# Patient Record
Sex: MALE | Race: WHITE | NOT HISPANIC OR LATINO | Employment: FULL TIME | URBAN - METROPOLITAN AREA
[De-identification: names, ages, dates, MRNs, and addresses within clinical notes are randomized per-mention and may not be internally consistent; named-entity substitution may affect disease eponyms.]

---

## 2021-01-14 ENCOUNTER — OFFICE VISIT (OUTPATIENT)
Dept: CARDIOLOGY | Facility: CLINIC | Age: 56
End: 2021-01-14

## 2021-01-14 VITALS
HEIGHT: 60 IN | OXYGEN SATURATION: 98 % | BODY MASS INDEX: 42.6 KG/M2 | HEART RATE: 70 BPM | WEIGHT: 217 LBS | SYSTOLIC BLOOD PRESSURE: 134 MMHG | DIASTOLIC BLOOD PRESSURE: 67 MMHG

## 2021-01-14 DIAGNOSIS — Z82.49 FAMILY HISTORY OF EARLY CAD: ICD-10-CM

## 2021-01-14 DIAGNOSIS — I20.0 UNSTABLE ANGINA PECTORIS (HCC): ICD-10-CM

## 2021-01-14 DIAGNOSIS — R42 POSTURAL DIZZINESS WITH PRESYNCOPE: Primary | ICD-10-CM

## 2021-01-14 DIAGNOSIS — R55 POSTURAL DIZZINESS WITH PRESYNCOPE: Primary | ICD-10-CM

## 2021-01-14 PROCEDURE — 99204 OFFICE O/P NEW MOD 45 MIN: CPT | Performed by: INTERNAL MEDICINE

## 2021-01-14 RX ORDER — SILDENAFIL 100 MG/1
100 TABLET, FILM COATED ORAL DAILY PRN
COMMUNITY
End: 2022-02-02 | Stop reason: ALTCHOICE

## 2021-01-14 RX ORDER — ONDANSETRON 4 MG/1
TABLET, ORALLY DISINTEGRATING ORAL AS NEEDED
COMMUNITY
Start: 2021-01-13

## 2021-01-14 RX ORDER — MECLIZINE HYDROCHLORIDE 25 MG/1
TABLET ORAL AS NEEDED
COMMUNITY
Start: 2021-01-13 | End: 2022-02-02

## 2021-01-14 RX ORDER — GABAPENTIN 100 MG/1
100 CAPSULE ORAL DAILY
COMMUNITY
Start: 2020-12-21

## 2021-01-14 NOTE — PROGRESS NOTES
"    Subjective:     Encounter Date:01/14/2021      Patient ID: Renzo Thompson is a 55 y.o. male.    Chief Complaint:  History of Present Illness  55-year-old white male patient with strong family history of CAD no previous cardiac issues comes to see me with episodes of chest discomfort with some arm pain on the right side dizziness presyncopal episode  Symptoms happened couple of days ago he was seen in the emergency room was told possible vertigo  EKG sinus rhythm borderline ST abnormalities  Advised patient echocardiogram stress test in the near future and Holter monitor  The following portions of the patient's history were reviewed and updated as appropriate: Allergies current medications past family history past medical history past social history past surgical history problem list and review of systems    /67 (BP Location: Left arm, Patient Position: Sitting, Cuff Size: Adult)   Pulse 70   Ht 71 cm (27.95\")   Wt 98.4 kg (217 lb)   SpO2 98%   .26 kg/m²     Past Medical History:   Diagnosis Date   • Hypertension      Past Surgical History:   Procedure Laterality Date   • 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   • HAND SURGERY     • HERNIA REPAIR      1985 & 1987   • TONSILLECTOMY       Social History     Socioeconomic History   • Marital status:      Spouse name: Not on file   • Number of children: Not on file   • Years of education: Not on file   • Highest education level: Not on file   Tobacco Use   • Smoking status: Former Smoker     Family History   Problem Relation Age of Onset   • Heart disease Father    • Hypertension Father        Current Outpatient Medications:   •  calcium citrate-vitamin d (CALCITRATE) 315-250 MG-UNIT tablet tablet, Take 1 tablet by mouth Daily., Disp: , Rfl:   •  esomeprazole (nexIUM) 20 MG capsule, Take 20 mg by mouth Every Morning Before Breakfast., Disp: , Rfl:   •  gabapentin (NEURONTIN) 100 MG capsule, Take 100 mg by mouth 2 (two) times a day., " Disp: , Rfl:   •  meclizine (ANTIVERT) 25 MG tablet, As Needed., Disp: , Rfl:   •  ondansetron ODT (ZOFRAN-ODT) 4 MG disintegrating tablet, As Needed., Disp: , Rfl:   •  sildenafil (VIAGRA) 100 MG tablet, Take 100 mg by mouth Daily As Needed for Erectile Dysfunction., Disp: , Rfl:   Allergies   Allergen Reactions   • Penicillins Unknown - Low Severity       Review of Systems   Constitution: Negative for fever and malaise/fatigue.   HENT: Negative for congestion and hearing loss.    Eyes: Negative for double vision and visual disturbance.   Cardiovascular: Positive for chest pain. Negative for claudication, dyspnea on exertion, leg swelling and syncope.   Respiratory: Negative for cough and shortness of breath.    Endocrine: Negative for cold intolerance.   Skin: Negative for color change and rash.   Musculoskeletal: Positive for arthritis. Negative for joint pain.   Gastrointestinal: Positive for heartburn. Negative for abdominal pain.   Genitourinary: Negative for hematuria.   Neurological: Positive for dizziness. Negative for excessive daytime sleepiness.   Psychiatric/Behavioral: Negative for depression. The patient is not nervous/anxious.    All other systems reviewed and are negative.             Objective:     Physical Exam  Patient alert not in any acute distress currently asymptomatic vital stable exam neck no JVP elevation lungs bilateral entry mostly clear heart sounds S1-S2 regular no significant murmur gallop or rub  Extremities no edema bilateral pulses present equal  No orthostatic hypotension  Procedures    Lab Review:       Assessment:          Diagnosis Plan   1. Postural dizziness with presyncope     2. Unstable angina pectoris (CMS/HCC)     3. Family history of early CAD            Plan:                  MDM  Number of Diagnoses or Management Options  Family history of early CAD: minor  Postural dizziness with presyncope: new, needed workup  Unstable angina pectoris (CMS/HCC): new, needed workup      Amount and/or Complexity of Data Reviewed  Clinical lab tests: ordered and reviewed  Review and summarize past medical records: yes  Independent visualization of images, tracings, or specimens: yes    Risk of Complications, Morbidity, and/or Mortality  Presenting problems: moderate  Management options: moderate    Patient Progress  Patient progress: other (comment)   We will schedule stress test echocardiogram Holter monitor close follow-up

## 2021-01-21 ENCOUNTER — OUTSIDE FACILITY SERVICE (OUTPATIENT)
Dept: CARDIOLOGY | Facility: CLINIC | Age: 56
End: 2021-01-21

## 2021-01-23 PROCEDURE — 93227 XTRNL ECG REC<48 HR R&I: CPT | Performed by: INTERNAL MEDICINE

## 2021-02-12 ENCOUNTER — TELEPHONE (OUTPATIENT)
Dept: CARDIOLOGY | Facility: CLINIC | Age: 56
End: 2021-02-12

## 2021-02-25 ENCOUNTER — OUTSIDE FACILITY SERVICE (OUTPATIENT)
Dept: CARDIOLOGY | Facility: CLINIC | Age: 56
End: 2021-02-25

## 2021-02-25 PROCEDURE — 93306 TTE W/DOPPLER COMPLETE: CPT | Performed by: INTERNAL MEDICINE

## 2021-02-25 PROCEDURE — 93016 CV STRESS TEST SUPVJ ONLY: CPT | Performed by: INTERNAL MEDICINE

## 2021-02-25 PROCEDURE — 78452 HT MUSCLE IMAGE SPECT MULT: CPT | Performed by: INTERNAL MEDICINE

## 2021-02-25 PROCEDURE — 93018 CV STRESS TEST I&R ONLY: CPT | Performed by: INTERNAL MEDICINE

## 2021-03-10 RX ORDER — AMLODIPINE BESYLATE 2.5 MG/1
2.5 TABLET ORAL DAILY
Qty: 90 TABLET | Refills: 3 | Status: SHIPPED | OUTPATIENT
Start: 2021-03-10 | End: 2021-03-25 | Stop reason: SDUPTHER

## 2021-03-10 NOTE — TELEPHONE ENCOUNTER
LOV 01/14/2021 - DENI PT    LABS no labs on file    NEXT OV unable to see future scheduled appts for Deni pt's.

## 2021-03-25 ENCOUNTER — OFFICE VISIT (OUTPATIENT)
Dept: CARDIOLOGY | Facility: CLINIC | Age: 56
End: 2021-03-25

## 2021-03-25 VITALS
OXYGEN SATURATION: 98 % | BODY MASS INDEX: 31.92 KG/M2 | HEART RATE: 98 BPM | HEIGHT: 71 IN | SYSTOLIC BLOOD PRESSURE: 136 MMHG | WEIGHT: 228 LBS | DIASTOLIC BLOOD PRESSURE: 81 MMHG

## 2021-03-25 DIAGNOSIS — E78.2 MIXED HYPERLIPIDEMIA: ICD-10-CM

## 2021-03-25 DIAGNOSIS — Z82.49 FAMILY HISTORY OF EARLY CAD: Primary | ICD-10-CM

## 2021-03-25 DIAGNOSIS — I27.20 PULMONARY HYPERTENSION (HCC): ICD-10-CM

## 2021-03-25 DIAGNOSIS — I10 ESSENTIAL HYPERTENSION: ICD-10-CM

## 2021-03-25 PROBLEM — I20.0 UNSTABLE ANGINA PECTORIS: Status: RESOLVED | Noted: 2021-01-14 | Resolved: 2021-03-25

## 2021-03-25 PROCEDURE — 99214 OFFICE O/P EST MOD 30 MIN: CPT | Performed by: INTERNAL MEDICINE

## 2021-03-25 PROCEDURE — 93010 ELECTROCARDIOGRAM REPORT: CPT | Performed by: INTERNAL MEDICINE

## 2021-03-25 RX ORDER — AMLODIPINE BESYLATE 2.5 MG/1
2.5 TABLET ORAL DAILY
Qty: 90 TABLET | Refills: 3 | Status: SHIPPED | OUTPATIENT
Start: 2021-03-25 | End: 2021-09-22 | Stop reason: SDUPTHER

## 2021-03-25 RX ORDER — METOPROLOL SUCCINATE 25 MG/1
25 TABLET, EXTENDED RELEASE ORAL DAILY
COMMUNITY
Start: 2021-03-20 | End: 2021-03-25 | Stop reason: SDUPTHER

## 2021-03-25 RX ORDER — ATORVASTATIN CALCIUM 20 MG/1
20 TABLET, FILM COATED ORAL DAILY
Qty: 90 TABLET | Refills: 3 | Status: SHIPPED | OUTPATIENT
Start: 2021-03-25 | End: 2021-09-22 | Stop reason: SDUPTHER

## 2021-03-25 RX ORDER — METOPROLOL SUCCINATE 25 MG/1
25 TABLET, EXTENDED RELEASE ORAL DAILY
Qty: 90 TABLET | Refills: 3 | Status: SHIPPED | OUTPATIENT
Start: 2021-03-25 | End: 2022-02-17 | Stop reason: SDUPTHER

## 2021-03-25 NOTE — PROGRESS NOTES
"    Subjective:     Encounter Date:03/25/2021      Patient ID: Renzo Thompson is a 56 y.o. male.    Chief Complaint: FU Testing - Dizzy - HTN  History of Present Illness   55-year-old white male patient with strong family history of CAD no previous cardiac issues comes to see me with episodes of chest discomfort with some arm pain on the right side dizziness presyncopal episode    January 2021 Holter monitor showed normal sinus rhythm rate supraventricular ectopy no ventricular ectopy noted rate salvos of supraventricular ectopy no significant pauses noted    February 2021 echocardiogram mild to moderate pulmonary hypertension RV size upper limits of normal LV function normalNo aortic stenosis  February 2021 stress Myoview showed large inferoapical fixed defect no ischemia EF 58%  Patient was started on low-dose Toprol-XL 25 mg every day amlodipine 2.5 mg every  Patient is feeling much better his recent labs showed  we will start Lipitor 20 mg every day  Patient will be followed again in 3 months with labs he is doing well on medical treatment advised pulmonary evaluation regarding sleep apnea      The following portions of the patient's history were reviewed and updated as appropriate: Allergies current medications past family history past medical history past social history past surgical history problem list and review of systems  Past Medical History:   Diagnosis Date   • Hypertension      Past Surgical History:   Procedure Laterality Date   • 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   • HAND SURGERY     • HERNIA REPAIR      1985 & 1987   • TONSILLECTOMY       /81 (BP Location: Left arm, Patient Position: Sitting, Cuff Size: Adult)   Pulse 98   Ht 180.3 cm (71\")   Wt 103 kg (228 lb)   SpO2 98%   BMI 31.80 kg/m²   Family History   Problem Relation Age of Onset   • Heart disease Father    • Hypertension Father        Current Outpatient Medications:   •  calcium citrate-vitamin d " (CALCITRATE) 315-250 MG-UNIT tablet tablet, Take 1 tablet by mouth Daily., Disp: , Rfl:   •  esomeprazole (nexIUM) 20 MG capsule, Take 20 mg by mouth Every Morning Before Breakfast., Disp: , Rfl:   •  gabapentin (NEURONTIN) 100 MG capsule, Take 100 mg by mouth 2 (two) times a day., Disp: , Rfl:   •  metoprolol succinate XL (TOPROL-XL) 25 MG 24 hr tablet, Take 25 mg by mouth Daily., Disp: , Rfl:   •  sildenafil (VIAGRA) 100 MG tablet, Take 100 mg by mouth Daily As Needed for Erectile Dysfunction., Disp: , Rfl:   •  amLODIPine (NORVASC) 2.5 MG tablet, Take 1 tablet by mouth Daily., Disp: 90 tablet, Rfl: 3  •  meclizine (ANTIVERT) 25 MG tablet, As Needed., Disp: , Rfl:   •  ondansetron ODT (ZOFRAN-ODT) 4 MG disintegrating tablet, As Needed., Disp: , Rfl:   Social History     Socioeconomic History   • Marital status:      Spouse name: Not on file   • Number of children: Not on file   • Years of education: Not on file   • Highest education level: Not on file   Tobacco Use   • Smoking status: Former Smoker     Allergies   Allergen Reactions   • Penicillins Unknown - Low Severity     Review of Systems   Constitutional: Negative for fever and malaise/fatigue.   HENT: Negative for congestion and hearing loss.    Eyes: Negative for double vision and visual disturbance.   Cardiovascular: Negative for chest pain, claudication, dyspnea on exertion, leg swelling and syncope.   Respiratory: Negative for cough and shortness of breath.    Endocrine: Negative for cold intolerance.   Skin: Negative for color change and rash.   Musculoskeletal: Negative for arthritis and joint pain.   Gastrointestinal: Negative for abdominal pain and heartburn.   Genitourinary: Positive for hematuria.   Neurological: Negative for excessive daytime sleepiness and dizziness.   Psychiatric/Behavioral: Negative for depression. The patient is not nervous/anxious.    All other systems reviewed and are negative.             Objective:     Physical  Exam  His vital stable neck no JVP elevation lungs clear heart sounds S1-S2 regular abdomen soft nontender extremities no edema bilateral pulses present equal  Procedures    Lab Review:       Assessment:          Diagnosis Plan   1. Family history of early CAD     2. Essential hypertension     3. Mixed hyperlipidemia            Plan:       MDM  Number of Diagnoses or Management Options  Essential hypertension: established, improving  Family history of early CAD: minor  Mixed hyperlipidemia: new, no workup  Pulmonary hypertension (CMS/HCC): new, needed workup     Amount and/or Complexity of Data Reviewed  Clinical lab tests: ordered and reviewed  Review and summarize past medical records: yes    Risk of Complications, Morbidity, and/or Mortality  Presenting problems: moderate  Management options: moderate    Patient Progress  Patient progress: stable

## 2021-07-16 DIAGNOSIS — E78.2 MIXED HYPERLIPIDEMIA: ICD-10-CM

## 2021-07-16 DIAGNOSIS — I27.20 PULMONARY HYPERTENSION (HCC): ICD-10-CM

## 2021-07-16 DIAGNOSIS — I20.0 UNSTABLE ANGINA PECTORIS (HCC): ICD-10-CM

## 2021-07-16 DIAGNOSIS — Z82.49 FAMILY HISTORY OF EARLY CAD: Primary | ICD-10-CM

## 2021-07-16 DIAGNOSIS — I10 ESSENTIAL HYPERTENSION: ICD-10-CM

## 2021-07-22 ENCOUNTER — OFFICE VISIT (OUTPATIENT)
Dept: CARDIOLOGY | Facility: CLINIC | Age: 56
End: 2021-07-22

## 2021-07-22 VITALS
WEIGHT: 233 LBS | SYSTOLIC BLOOD PRESSURE: 141 MMHG | OXYGEN SATURATION: 99 % | HEART RATE: 60 BPM | BODY MASS INDEX: 32.62 KG/M2 | HEIGHT: 71 IN | DIASTOLIC BLOOD PRESSURE: 95 MMHG

## 2021-07-22 DIAGNOSIS — E78.2 MIXED HYPERLIPIDEMIA: ICD-10-CM

## 2021-07-22 DIAGNOSIS — Z82.49 FAMILY HISTORY OF EARLY CAD: Primary | ICD-10-CM

## 2021-07-22 DIAGNOSIS — I10 ESSENTIAL HYPERTENSION: ICD-10-CM

## 2021-07-22 PROCEDURE — 99214 OFFICE O/P EST MOD 30 MIN: CPT | Performed by: INTERNAL MEDICINE

## 2021-07-22 NOTE — PROGRESS NOTES
Subjective:     Encounter Date:07/22/2021      Patient ID: Renzo Thompson is a 56 y.o. male.    Chief Complaint: Coronary Artery Disease  History of Present Illness  56-year-old white male patient with strong family history of CAD no previous cardiac issues comes to see me with episodes of chest discomfort with some arm pain on the right side dizziness presyncopal episode    January 2021 Holter monitor showed normal sinus rhythm rate supraventricular ectopy no ventricular ectopy noted rate salvos of supraventricular ectopy no significant pauses noted    February 2021 echocardiogram mild to moderate pulmonary hypertension RV size upper limits of normal LV function normalNo aortic stenosis  February 2021 stress Myoview showed large inferoapical fixed defect no ischemia EF 58%  Patient was started on low-dose Toprol-XL 25 mg every day amlodipine 2.5 mg every  Patient is feeling much better his recent labs showed  we will start Lipitor 20 mg every day  Patient will be followed again in 3 months with labs he is doing well on medical treatment advised pulmonary evaluation regarding sleep apnea      Patient comes back for follow-up he is doing much better now is tolerating statins recent labs reviewed LDL is down to 76 electrolytes within normal limit liver enzymes normal.    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   • HAND SURGERY     • HERNIA REPAIR      1985 & 1987   • TONSILLECTOMY    "    /95 (BP Location: Left arm, Patient Position: Sitting, Cuff Size: Adult)   Pulse 60   Ht 180.3 cm (71\")   Wt 106 kg (233 lb)   SpO2 99%   BMI 32.50 kg/m²   Family History   Problem Relation Age of Onset   • Heart disease Father    • Hypertension Father        Current Outpatient Medications:   •  amLODIPine (NORVASC) 2.5 MG tablet, Take 1 tablet by mouth Daily., Disp: 90 tablet, Rfl: 3  •  atorvastatin (LIPITOR) 20 MG tablet, Take 1 tablet by mouth Daily., Disp: 90 tablet, Rfl: 3  •  calcium citrate-vitamin d (CALCITRATE) 315-250 MG-UNIT tablet tablet, Take 1 tablet by mouth Daily., Disp: , Rfl:   •  esomeprazole (nexIUM) 20 MG capsule, Take 20 mg by mouth Every Morning Before Breakfast., Disp: , Rfl:   •  gabapentin (NEURONTIN) 100 MG capsule, Take 100 mg by mouth 2 (two) times a day., Disp: , Rfl:   •  meclizine (ANTIVERT) 25 MG tablet, As Needed., Disp: , Rfl:   •  metoprolol succinate XL (TOPROL-XL) 25 MG 24 hr tablet, Take 1 tablet by mouth Daily., Disp: 90 tablet, Rfl: 3  •  ondansetron ODT (ZOFRAN-ODT) 4 MG disintegrating tablet, As Needed., Disp: , Rfl:   •  sildenafil (VIAGRA) 100 MG tablet, Take 100 mg by mouth Daily As Needed for Erectile Dysfunction., Disp: , Rfl:   Social History     Socioeconomic History   • Marital status:      Spouse name: Not on file   • Number of children: Not on file   • Years of education: Not on file   • Highest education level: Not on file   Tobacco Use   • Smoking status: Former Smoker   • Smokeless tobacco: Never Used   Vaping Use   • Vaping Use: Never used   Substance and Sexual Activity   • Alcohol use: Not Currently   • Drug use: Never   • Sexual activity: Defer     Allergies   Allergen Reactions   • Penicillins Unknown - Low Severity     Review of Systems   Constitutional: Negative for fever and malaise/fatigue.   HENT: Negative for congestion and hearing loss.    Eyes: Negative for double vision and visual disturbance.   Cardiovascular: Negative for " chest pain, claudication, dyspnea on exertion, leg swelling and syncope.   Respiratory: Negative for cough and shortness of breath.    Endocrine: Negative for cold intolerance.   Skin: Negative for color change and rash.   Musculoskeletal: Negative for arthritis and joint pain.   Gastrointestinal: Negative for abdominal pain and heartburn.   Genitourinary: Negative for hematuria.   Neurological: Negative for excessive daytime sleepiness and dizziness.   Psychiatric/Behavioral: Negative for depression. The patient is not nervous/anxious.    All other systems reviewed and are negative.             Objective:     Constitutional:       Appearance: Well-developed.   Eyes:      General: No scleral icterus.     Conjunctiva/sclera: Conjunctivae normal.   HENT:      Head: Normocephalic and atraumatic.    Mouth/Throat:      Mouth: No oral lesions.      Pharynx: Uvula midline.   Neck:      Thyroid: No thyromegaly.      Vascular: No carotid bruit or JVD.      Trachea: Trachea normal.   Pulmonary:      Effort: Pulmonary effort is normal.      Breath sounds: Normal breath sounds.   Cardiovascular:      Normal rate. Regular rhythm.      No gallop.   Pulses:     Intact distal pulses.   Abdominal:      General: Bowel sounds are normal.      Palpations: Abdomen is soft.   Musculoskeletal: Normal range of motion.      Cervical back: Neck supple. Skin:     General: Skin is warm. There is no cyanosis.   Neurological:      Mental Status: Alert and oriented to person, place, and time.      Comments: No focal deficits   Psychiatric:         Behavior: Behavior is cooperative.         Procedures    Lab Review:       Assessment:          Diagnosis Plan   1. Family history of early CAD     2. Mixed hyperlipidemia     3. Essential hypertension            Plan:       MDM  Number of Diagnoses or Management Options  Essential hypertension: established, improving  Family history of early CAD: established, improving  Mixed hyperlipidemia:  established, improving     Amount and/or Complexity of Data Reviewed  Clinical lab tests: ordered and reviewed  Review and summarize past medical records: yes    Risk of Complications, Morbidity, and/or Mortality  Presenting problems: moderate  Management options: moderate    Patient Progress  Patient progress: stable

## 2021-09-22 ENCOUNTER — TELEPHONE (OUTPATIENT)
Dept: CARDIOLOGY | Facility: CLINIC | Age: 56
End: 2021-09-22

## 2021-09-22 RX ORDER — AMLODIPINE BESYLATE 2.5 MG/1
2.5 TABLET ORAL DAILY
Qty: 90 TABLET | Refills: 3 | Status: SHIPPED | OUTPATIENT
Start: 2021-09-22 | End: 2022-12-12 | Stop reason: SDUPTHER

## 2021-09-22 RX ORDER — ATORVASTATIN CALCIUM 20 MG/1
20 TABLET, FILM COATED ORAL DAILY
Qty: 90 TABLET | Refills: 3 | Status: SHIPPED | OUTPATIENT
Start: 2021-09-22 | End: 2022-02-02 | Stop reason: SDUPTHER

## 2021-09-22 NOTE — TELEPHONE ENCOUNTER
Rx Refill Note  Requested Prescriptions     Pending Prescriptions Disp Refills   • amLODIPine (NORVASC) 2.5 MG tablet 90 tablet 3     Sig: Take 1 tablet by mouth Daily.   • atorvastatin (LIPITOR) 20 MG tablet 90 tablet 3     Sig: Take 1 tablet by mouth Daily.      Last office visit with prescribing clinician: 7/22/2021      Next office visit with prescribing clinician: f/u in [place]     Lipid Panel (07/21/2021)  Comprehensive Metabolic Panel (07/21/2021)         Angie Hernández MA  09/22/21, 09:14 EDT

## 2021-09-22 NOTE — TELEPHONE ENCOUNTER
Incoming Refill Request      Medication requested (name and dose): Amlodipine 2.5 MG  Atorvastatin 20 MG    Pharmacy where request should be sent:    [facility]    Additional details provided by patient: He is completely out. Called pharmacy, told not due for refill until Dec.     Best call back number: [phone]    Does the patient have less than a 3 day supply:  [x] Yes  [] No    Yuval Leach Rep  09/22/21, 09:04 EDT

## 2022-02-02 ENCOUNTER — OFFICE VISIT (OUTPATIENT)
Dept: CARDIOLOGY | Facility: CLINIC | Age: 57
End: 2022-02-02

## 2022-02-02 VITALS
OXYGEN SATURATION: 96 % | DIASTOLIC BLOOD PRESSURE: 95 MMHG | BODY MASS INDEX: 32.06 KG/M2 | SYSTOLIC BLOOD PRESSURE: 147 MMHG | WEIGHT: 229 LBS | HEIGHT: 71 IN | HEART RATE: 98 BPM

## 2022-02-02 DIAGNOSIS — I10 ESSENTIAL HYPERTENSION: ICD-10-CM

## 2022-02-02 DIAGNOSIS — Z82.49 FAMILY HISTORY OF EARLY CAD: Primary | ICD-10-CM

## 2022-02-02 DIAGNOSIS — E78.2 MIXED HYPERLIPIDEMIA: ICD-10-CM

## 2022-02-02 PROCEDURE — 99213 OFFICE O/P EST LOW 20 MIN: CPT | Performed by: INTERNAL MEDICINE

## 2022-02-02 RX ORDER — ATORVASTATIN CALCIUM 20 MG/1
20 TABLET, FILM COATED ORAL DAILY
Qty: 90 TABLET | Refills: 3 | Status: SHIPPED | OUTPATIENT
Start: 2022-02-02 | End: 2022-05-03

## 2022-02-02 RX ORDER — IBUPROFEN 800 MG
TABLET ORAL DAILY
COMMUNITY

## 2022-02-02 RX ORDER — VARDENAFIL 11.85 MG/1
TABLET ORAL
COMMUNITY
End: 2022-05-25 | Stop reason: DRUGHIGH

## 2022-02-02 NOTE — PROGRESS NOTES
HP      Name: Renzo Thompson ADMIT: (Not on file)   : 1965  PCP: Rosy Love APRN    MRN: [id number] LOS: 0 days   AGE/SEX: 57 y.o. male  ROOM: Room/bed info not found     Chief Complaint   Patient presents with   • Coronary Artery Disease     6 mo f/u       Subjective         History of present illness  Renzo Thompson is a 57-year-old male patient who has hypertension, dyslipidemia, family history of coronary artery disease, is here today for cardiac follow-up.  Patient is doing well denies any chest pain or shortness of breath, no palpitations no lower extremity edema no syncopal episodes.  No recent hospitalizations.  Patient has been on Lipitor for dyslipidemia however it was not renewed Dr. Laurent left the practice.    Past Medical History:   Diagnosis Date   • Hypertension    • Mixed hyperlipidemia 3/25/2021   • Pulmonary hypertension (HCC) 3/25/2021     Past Surgical History:   Procedure Laterality Date   • AMPUTATION      tip of middle finger left hand   • HAND SURGERY     • HERNIA REPAIR       &    • TONSILLECTOMY       Family History   Problem Relation Age of Onset   • Heart disease Father    • Hypertension Father      Social History     Tobacco Use   • Smoking status: Former Smoker   • Smokeless tobacco: Never Used   Vaping Use   • Vaping Use: Never used   Substance Use Topics   • Alcohol use: Not Currently   • Drug use: Never     (Not in a hospital admission)    Allergies:  Penicillins    Review of systems    Constitutional: Negative.    Respiratory and cardiovascular: As detailed in HPI section.  Gastrointestinal: Negative for constipation, nausea and vomiting negative for abdominal distention, abdominal pain and diarrhea.   Genitourinary: Negative for difficulty urinating and flank pain.   Musculoskeletal: Negative for arthralgias, joint swelling and myalgias.   Skin: Negative for color change, rash and wound.   Neurological: Negative for dizziness, syncope, weakness and  headaches.   Hematological: Negative for adenopathy.   Psychiatric/Behavioral: Negative for confusion.   All other systems reviewed and are negative.    Physical Exam  VITALS REVIEWED    General:      well developed, in no acute distress.    Head:      normocephalic and atraumatic.    Eyes:      PERRL/EOM intact, conjunctiva and sclera clear with out nystagmus.    Neck:      no masses, thyromegaly,  trachea central with normal respiratory effort and PMI displaced laterally  Lungs:      Clear to auscultation bilaterally  Heart:       Regular rate and rhythm  Msk:      no deformity or scoliosis noted of thoracic or lumbar spine.    Pulses:      pulses normal in all 4 extremities.    Extremities:       No lower extremity edema  Neurologic:      no focal deficits.   alert oriented x3  Skin:      intact without lesions or rashes.    Psych:      alert and cooperative; normal mood and affect; normal attention span and concentration.      Result Review :                Pertinent cardiac workup    1. EKG March 28, 2021 sinus rhythm normal EKG  2. Stress test 2/25/2021 inferior apical fixed defect, ejection fraction 58%  3. Echocardiogram 3/1/2021 ejection fraction 60%      Procedures        Assessment and Plan      Renzo Thompson is a 57-year-old male patient who has hypertension, dyslipidemia, family history of coronary artery disease in his father, age is therefore cardiac follow-up.  Patient does not have any anginal symptoms or CHF symptoms.  His lipid profile in July 2021 had shown LDL of 76, at that time he was on Lipitor.  I will go ahead and renew his Lipitor 20 mg a day.  His blood pressure in general is well controlled about 120/85 range.  At this time he does not need any specific work-up and I will see him in follow-up in 1 year or sooner if he has any cardiac issues.    Diagnoses and all orders for this visit:    1. Family history of early CAD (Primary)    2. Mixed hyperlipidemia    3. Essential  hypertension    Other orders  -     atorvastatin (LIPITOR) 20 MG tablet; Take 1 tablet by mouth Daily for 90 days.  Dispense: 90 tablet; Refill: 3           No follow-ups on file.  Patient was given instructions and counseling regarding his condition or for health maintenance advice. Please see specific information pulled into the AVS if appropriate.

## 2022-02-17 RX ORDER — METOPROLOL SUCCINATE 25 MG/1
25 TABLET, EXTENDED RELEASE ORAL DAILY
Qty: 90 TABLET | Refills: 3 | Status: SHIPPED | OUTPATIENT
Start: 2022-02-17 | End: 2023-03-06

## 2022-02-17 NOTE — TELEPHONE ENCOUNTER
Rx Refill Note  Requested Prescriptions     Pending Prescriptions Disp Refills   • metoprolol succinate XL (TOPROL-XL) 25 MG 24 hr tablet 90 tablet 3     Sig: Take 1 tablet by mouth Daily.      Last office visit with prescribing clinician: 2/2/2022      Next office visit with prescribing clinician: f/u in [place]     Comprehensive Metabolic Panel (07/21/2021)         Angie Hernández MA  02/17/22, 09:13 EST

## 2022-05-25 ENCOUNTER — OFFICE VISIT (OUTPATIENT)
Dept: CARDIOLOGY | Facility: CLINIC | Age: 57
End: 2022-05-25

## 2022-05-25 VITALS
SYSTOLIC BLOOD PRESSURE: 128 MMHG | OXYGEN SATURATION: 98 % | BODY MASS INDEX: 31.08 KG/M2 | HEIGHT: 71 IN | DIASTOLIC BLOOD PRESSURE: 81 MMHG | HEART RATE: 62 BPM | WEIGHT: 222 LBS

## 2022-05-25 DIAGNOSIS — Z82.49 FAMILY HISTORY OF EARLY CAD: ICD-10-CM

## 2022-05-25 DIAGNOSIS — E78.2 MIXED HYPERLIPIDEMIA: ICD-10-CM

## 2022-05-25 DIAGNOSIS — I10 ESSENTIAL HYPERTENSION: ICD-10-CM

## 2022-05-25 DIAGNOSIS — I20.8 OTHER FORMS OF ANGINA PECTORIS: Primary | ICD-10-CM

## 2022-05-25 PROCEDURE — 99214 OFFICE O/P EST MOD 30 MIN: CPT | Performed by: INTERNAL MEDICINE

## 2022-05-25 RX ORDER — VARDENAFIL HYDROCHLORIDE 20 MG/1
20 TABLET ORAL AS NEEDED
COMMUNITY
Start: 2022-03-21

## 2022-05-25 RX ORDER — ATORVASTATIN CALCIUM 20 MG/1
20 TABLET, FILM COATED ORAL DAILY
COMMUNITY
Start: 2022-05-03 | End: 2023-04-03 | Stop reason: SDUPTHER

## 2022-05-25 RX ORDER — NITROGLYCERIN 0.4 MG/1
TABLET SUBLINGUAL
Qty: 100 TABLET | Refills: 3 | Status: SHIPPED | OUTPATIENT
Start: 2022-05-25 | End: 2022-05-25

## 2022-05-25 NOTE — PROGRESS NOTES
Progress note      Name: Renzo Thompson ADMIT: (Not on file)   : 1965  PCP: Rosy Love APRN    MRN: [id number] LOS: 0 days   AGE/SEX: 57 y.o. male  ROOM: Room/bed info not found     Chief Complaint   Patient presents with   • Coronary Artery Disease     3 mo f/u       Subjective       History of present illness  Renzo Thompson is a 57-year-old male patient who has hypertension, dyslipidemia, family history of coronary artery disease, here today for cardiac follow-up.  Patient has been experiencing dull achy pain in his left chest with sometimes radiation down to his left arm.  The pain lasts several minutes at a time.  It has not been severe enough for him to go to the ER or take nitroglycerin.  The pain mostly occurs at rest and is not exertional.    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    Head:      normocephalic and atraumatic.    Eyes:      PERRL/EOM intact, conjunctiva and sclera clear with out nystagmus.    Neck:      no masses, thyromegaly,  trachea central with normal respiratory effort and PMI displaced laterally  Lungs:      Clear to auscultation bilaterally  Heart:       Regular rate and rhythm  Msk:      no deformity or scoliosis noted of thoracic or  lumbar spine.    Pulses:      pulses normal in all 4 extremities.    Extremities:       No lower extremity edema  Neurologic:      no focal deficits.   alert oriented x3  Skin:      intact without lesions or rashes.    Psych:      alert and cooperative; normal mood and affect; normal attention span and concentration.      Result Review :               Pertinent cardiac workup      1. EKG March 28, 2021 sinus rhythm normal EKG  2. Stress test 2/25/2021 inferior apical fixed defect, ejection fraction 58%  3. Echocardiogram 3/1/2021 ejection fraction 60%      Procedures        Assessment and Plan      Renzo Thompson is a 57-year-old male patient who has hypertension, dyslipidemia, family history of CAD in his father when he was in his 40s, here today for follow-up.  Patient has been experiencing dull achy pain in his left chest.  Previously he has had stress test which was essentially normal in February 2021.  I would like to assess his chest pain with a CTA prior to referring him for heart catheterization if abnormal.  Patient is agreeable.  He is okay having it done at [facility] in [place].    Diagnoses and all orders for this visit:    1. Other forms of angina pectoris (HCC) (Primary)  -     CT Angiogram Coronary; Future    2. Family history of early CAD    3. Mixed hyperlipidemia    4. Essential hypertension    Other orders  -     Discontinue: nitroglycerin (NITROSTAT) 0.4 MG SL tablet; 1 under the tongue as needed for angina, may repeat q5mins for up three doses  Dispense: 100 tablet; Refill: 3           No follow-ups on file.  Patient was given instructions and counseling regarding his condition or for health maintenance advice. Please see specific information pulled into the AVS if appropriate.

## 2022-06-22 ENCOUNTER — TELEPHONE (OUTPATIENT)
Dept: CARDIOLOGY | Facility: CLINIC | Age: 57
End: 2022-06-22

## 2022-06-22 NOTE — TELEPHONE ENCOUNTER
CT SCHEDULED 7/26/22. HE NEEDS WRITTEN PRESCRIPTION FOR METOPROLOL. HE NEEDS TO TAKE 100 MG THE NIGHT PRIOR TO CT, AND 50 MG 3 HRS PRIOR. [facility].

## 2022-06-22 NOTE — TELEPHONE ENCOUNTER
Called pt and advsd per Dr. Prater to take a log a wk prior of his heart rate and then to call us back with his readings and then will determine what mg to take for his CT procedure. He verbally understood.

## 2022-07-26 ENCOUNTER — APPOINTMENT (OUTPATIENT)
Dept: CT IMAGING | Facility: HOSPITAL | Age: 57
End: 2022-07-26

## 2022-08-17 ENCOUNTER — TELEPHONE (OUTPATIENT)
Dept: CARDIOLOGY | Facility: CLINIC | Age: 57
End: 2022-08-17

## 2022-08-17 NOTE — TELEPHONE ENCOUNTER
CALLED AND SPOKE TO PATIENTS WIFE REGARDING CTA. SHE STATED THAT THEY CANCELLED THAT AND PATIENT DOES NOT WANT TO SCHEDULE NOW.

## 2022-12-12 RX ORDER — AMLODIPINE BESYLATE 2.5 MG/1
2.5 TABLET ORAL DAILY
Qty: 90 TABLET | Refills: 3 | Status: SHIPPED | OUTPATIENT
Start: 2022-12-12

## 2022-12-12 NOTE — TELEPHONE ENCOUNTER
Caller: TANA MAKI    Relationship: Emergency Contact    Best call back number: [phone]    Requested Prescriptions:   Requested Prescriptions     Pending Prescriptions Disp Refills   • amLODIPine (NORVASC) 2.5 MG tablet 90 tablet 3     Sig: Take 1 tablet by mouth Daily.        Pharmacy where request should be sent: [facility] #[id number] - [place], [place] - [address] - [phone]  - [phone] FX     Additional details provided by patient:     Does the patient have less than a 3 day supply:  [x] Yes  [] No    Would you like a call back once the refill request has been completed: [x] Yes [] No    If the office needs to give you a call back, can they leave a voicemail: [x] Yes [] No    Yuval Hernandez Rep   12/12/22 10:58 EST

## 2022-12-12 NOTE — TELEPHONE ENCOUNTER
Rx Refill Note  Requested Prescriptions     Pending Prescriptions Disp Refills   • amLODIPine (NORVASC) 2.5 MG tablet 90 tablet 3     Sig: Take 1 tablet by mouth Daily.      Last office visit with prescribing clinician: 5/25/2022   Last telemedicine visit with prescribing clinician: Visit date not found   Next office visit with prescribing clinician: Visit date not found                         Would you like a call back once the refill request has been completed: [] Yes [x] No    If the office needs to give you a call back, can they leave a voicemail: [x] Yes [] No    Luann Guevara MA  12/12/22, 11:05 EST

## 2023-01-26 ENCOUNTER — APPOINTMENT (OUTPATIENT)
Dept: GENERAL RADIOLOGY | Facility: HOSPITAL | Age: 58
End: 2023-01-26
Payer: COMMERCIAL

## 2023-01-26 ENCOUNTER — APPOINTMENT (OUTPATIENT)
Dept: CT IMAGING | Facility: HOSPITAL | Age: 58
End: 2023-01-26
Payer: COMMERCIAL

## 2023-01-26 ENCOUNTER — HOSPITAL ENCOUNTER (EMERGENCY)
Facility: HOSPITAL | Age: 58
Discharge: HOME | End: 2023-01-26
Attending: EMERGENCY MEDICINE | Admitting: EMERGENCY MEDICINE
Payer: COMMERCIAL

## 2023-01-26 VITALS
TEMPERATURE: 98.2 F | WEIGHT: 230.38 LBS | DIASTOLIC BLOOD PRESSURE: 83 MMHG | OXYGEN SATURATION: 95 % | HEART RATE: 58 BPM | SYSTOLIC BLOOD PRESSURE: 138 MMHG | HEIGHT: 71 IN | BODY MASS INDEX: 32.25 KG/M2 | RESPIRATION RATE: 16 BRPM

## 2023-01-26 DIAGNOSIS — R51.9 NONINTRACTABLE HEADACHE, UNSPECIFIED CHRONICITY PATTERN, UNSPECIFIED HEADACHE TYPE: ICD-10-CM

## 2023-01-26 DIAGNOSIS — R42 LIGHTHEADED: Primary | ICD-10-CM

## 2023-01-26 LAB
ALBUMIN SERPL-MCNC: 4.8 G/DL (ref 3.5–5.2)
ALBUMIN/GLOB SERPL: 2.2 G/DL
ALP SERPL-CCNC: 79 U/L (ref 39–117)
ALT SERPL W P-5'-P-CCNC: 19 U/L (ref 1–41)
ANION GAP SERPL CALCULATED.3IONS-SCNC: 9 MMOL/L (ref 5–15)
AST SERPL-CCNC: 16 U/L (ref 1–40)
BASOPHILS # BLD AUTO: 0 10*3/MM3 (ref 0–0.2)
BASOPHILS NFR BLD AUTO: 0.3 % (ref 0–1.5)
BILIRUB SERPL-MCNC: 0.5 MG/DL (ref 0–1.2)
BUN SERPL-MCNC: 22 MG/DL (ref 6–20)
BUN/CREAT SERPL: 21.8 (ref 7–25)
CALCIUM SPEC-SCNC: 9.6 MG/DL (ref 8.6–10.5)
CHLORIDE SERPL-SCNC: 104 MMOL/L (ref 98–107)
CO2 SERPL-SCNC: 26 MMOL/L (ref 22–29)
CREAT SERPL-MCNC: 1.01 MG/DL (ref 0.76–1.27)
DEPRECATED RDW RBC AUTO: 42.4 FL (ref 37–54)
EGFRCR SERPLBLD CKD-EPI 2021: 87.8 ML/MIN/1.73
EOSINOPHIL # BLD AUTO: 0 10*3/MM3 (ref 0–0.4)
EOSINOPHIL NFR BLD AUTO: 0 % (ref 0.3–6.2)
ERYTHROCYTE [DISTWIDTH] IN BLOOD BY AUTOMATED COUNT: 12.8 % (ref 12.3–15.4)
ERYTHROCYTE [SEDIMENTATION RATE] IN BLOOD: 3 MM/HR (ref 0–20)
GLOBULIN UR ELPH-MCNC: 2.2 GM/DL
GLUCOSE SERPL-MCNC: 113 MG/DL (ref 65–99)
HCT VFR BLD AUTO: 43.8 % (ref 37.5–51)
HGB BLD-MCNC: 15.3 G/DL (ref 13–17.7)
LYMPHOCYTES # BLD AUTO: 2.4 10*3/MM3 (ref 0.7–3.1)
LYMPHOCYTES NFR BLD AUTO: 20.9 % (ref 19.6–45.3)
MCH RBC QN AUTO: 31.7 PG (ref 26.6–33)
MCHC RBC AUTO-ENTMCNC: 34.9 G/DL (ref 31.5–35.7)
MCV RBC AUTO: 91 FL (ref 79–97)
MONOCYTES # BLD AUTO: 0.7 10*3/MM3 (ref 0.1–0.9)
MONOCYTES NFR BLD AUTO: 6.2 % (ref 5–12)
NEUTROPHILS NFR BLD AUTO: 72.6 % (ref 42.7–76)
NEUTROPHILS NFR BLD AUTO: 8.5 10*3/MM3 (ref 1.7–7)
NRBC BLD AUTO-RTO: 0 /100 WBC (ref 0–0.2)
PLATELET # BLD AUTO: 260 10*3/MM3 (ref 140–450)
PMV BLD AUTO: 7.8 FL (ref 6–12)
POTASSIUM SERPL-SCNC: 4.4 MMOL/L (ref 3.5–5.2)
PROT SERPL-MCNC: 7 G/DL (ref 6–8.5)
QT INTERVAL: 363 MS
RBC # BLD AUTO: 4.82 10*6/MM3 (ref 4.14–5.8)
SODIUM SERPL-SCNC: 139 MMOL/L (ref 136–145)
TROPONIN T SERPL-MCNC: <0.01 NG/ML (ref 0–0.03)
TROPONIN T SERPL-MCNC: <0.01 NG/ML (ref 0–0.03)
WBC NRBC COR # BLD: 11.7 10*3/MM3 (ref 3.4–10.8)

## 2023-01-26 PROCEDURE — 84484 ASSAY OF TROPONIN QUANT: CPT

## 2023-01-26 PROCEDURE — 93005 ELECTROCARDIOGRAM TRACING: CPT | Performed by: EMERGENCY MEDICINE

## 2023-01-26 PROCEDURE — 85025 COMPLETE CBC W/AUTO DIFF WBC: CPT

## 2023-01-26 PROCEDURE — 25010000002 PROCHLORPERAZINE 10 MG/2ML SOLUTION

## 2023-01-26 PROCEDURE — 99284 EMERGENCY DEPT VISIT MOD MDM: CPT

## 2023-01-26 PROCEDURE — 25010000002 KETOROLAC TROMETHAMINE PER 15 MG

## 2023-01-26 PROCEDURE — 80053 COMPREHEN METABOLIC PANEL: CPT

## 2023-01-26 PROCEDURE — 93005 ELECTROCARDIOGRAM TRACING: CPT

## 2023-01-26 PROCEDURE — 25010000002 DIPHENHYDRAMINE PER 50 MG

## 2023-01-26 PROCEDURE — 36415 COLL VENOUS BLD VENIPUNCTURE: CPT

## 2023-01-26 PROCEDURE — 85652 RBC SED RATE AUTOMATED: CPT

## 2023-01-26 PROCEDURE — 96374 THER/PROPH/DIAG INJ IV PUSH: CPT

## 2023-01-26 PROCEDURE — 96375 TX/PRO/DX INJ NEW DRUG ADDON: CPT

## 2023-01-26 PROCEDURE — 70450 CT HEAD/BRAIN W/O DYE: CPT

## 2023-01-26 PROCEDURE — 71045 X-RAY EXAM CHEST 1 VIEW: CPT

## 2023-01-26 RX ORDER — MECLIZINE HYDROCHLORIDE 25 MG/1
25 TABLET ORAL 3 TIMES DAILY PRN
Qty: 30 TABLET | Refills: 0 | Status: SHIPPED | OUTPATIENT
Start: 2023-01-26

## 2023-01-26 RX ORDER — PROCHLORPERAZINE EDISYLATE 5 MG/ML
5 INJECTION INTRAMUSCULAR; INTRAVENOUS ONCE
Status: COMPLETED | OUTPATIENT
Start: 2023-01-26 | End: 2023-01-26

## 2023-01-26 RX ORDER — KETOROLAC TROMETHAMINE 15 MG/ML
15 INJECTION, SOLUTION INTRAMUSCULAR; INTRAVENOUS ONCE
Status: COMPLETED | OUTPATIENT
Start: 2023-01-26 | End: 2023-01-26

## 2023-01-26 RX ORDER — SODIUM CHLORIDE 0.9 % (FLUSH) 0.9 %
10 SYRINGE (ML) INJECTION AS NEEDED
Status: DISCONTINUED | OUTPATIENT
Start: 2023-01-26 | End: 2023-01-26 | Stop reason: HOSPADM

## 2023-01-26 RX ORDER — MECLIZINE HYDROCHLORIDE 25 MG/1
25 TABLET ORAL ONCE
Status: COMPLETED | OUTPATIENT
Start: 2023-01-26 | End: 2023-01-26

## 2023-01-26 RX ORDER — DIPHENHYDRAMINE HYDROCHLORIDE 50 MG/ML
25 INJECTION INTRAMUSCULAR; INTRAVENOUS ONCE
Status: COMPLETED | OUTPATIENT
Start: 2023-01-26 | End: 2023-01-26

## 2023-01-26 RX ADMIN — SODIUM CHLORIDE 1000 ML: 9 INJECTION, SOLUTION INTRAVENOUS at 15:41

## 2023-01-26 RX ADMIN — KETOROLAC TROMETHAMINE 15 MG: 15 INJECTION, SOLUTION INTRAMUSCULAR; INTRAVENOUS at 17:17

## 2023-01-26 RX ADMIN — PROCHLORPERAZINE EDISYLATE 5 MG: 5 INJECTION INTRAMUSCULAR; INTRAVENOUS at 17:23

## 2023-01-26 RX ADMIN — DIPHENHYDRAMINE HYDROCHLORIDE 25 MG: 50 INJECTION, SOLUTION INTRAMUSCULAR; INTRAVENOUS at 17:19

## 2023-01-26 RX ADMIN — MECLIZINE HYDROCHLORIDE 25 MG: 25 TABLET ORAL at 20:03

## 2023-02-01 NOTE — PROGRESS NOTES
Subjective: Dizziness, Near Syncope and Headache     Patient ID: Renzo Thompson is a 58 y.o. male.    CHIEF COMPLAINT: Dizziness, Near Syncope and headache     History of Present Illness Mr Thompson is a very pleasant 58 year old Caucasions male with a BMI 32.08 who presented alone for a New patient appointment referred by   [facility].  The patient reports that he started having aches on January 2 while on vacation.  He states it is a frontal headache including both temporal areas.  He states that it is present all the time and will somewhat vary in intensity.  He denies any phonophobia photophobia or nausea associated with this headache.  It was thought initially to be sinus related and he has had a prescription for Keflex as well as a Z-Giorgio, prednisone with no improvement.  He was given Topamax but was unable to tolerate it after 3 days at a dose of 25 mg.    He reports occasional dizziness or imbalance.  He states if he turns his head quickly to the right or left he can cause this sensation.  He states that has been treated fairly well with meclizine.  He reports this started approximately the same time as the headaches.  He states he has felt as if he was going to pass out but has never passed out.  He was notified if he did have a syncopal episode that he would not be able to drive until 3 months of no syncopal episodes.  He was told to notify the clinic if he has any syncopal episodes.    He reports frequent blurred vision since the onset of the headache.  He states prior to the headache he did see an optometrist and received new glasses but is never seen an ophthalmologist.  Since the blurred vision occurred after the headache syndrome I feel the patient will need evaluation by ophthalmology.    CHI history: The patient states over the past few years he has had head injuries including 1 that was frontal and caused a severe laceration.  He denies any LOC with any of the head injuries.      Headache  Complaint: Frontal  h/a   Onset: Jan 2 and has not stopped   Aura: none   Location: now bitemporal and frontal   Quality: Pressure   Severity: 2-6   Duration:  Continual   Frequency: continual   Timing: unrelated   Context: computer work   Modifying factors: Sleep   Associated Signs and symptoms:  Dizziness, blurred vision    Current meds: None   Steroid pac: completed   Med: Topamax   Complaint: could not tolerate         Dizziness/imbalance, near syncope   onset: Jan 2   Quality: wobbly off balance like passing out   Severity: can be severe  Duration:  15-20 min   Frequency: daily   Timing: mid day   Context: Turning head left to right   Modifying factors: Meclizine   Associated Signs and symptoms:  No nausea, no syncope   Current meds: Meclizine               [facility], visit January 14, 2023 chest x-ray negative, urinalysis and drug screen negative CT of the facial bones noted right maxillary sinusitis, prescribed Keflex.  At this visit he reported a history of migraines.     Differentials: Migraine, ocular migraine, ICH, giant cell arteritis, electrolyte imbalance, ACS     Labs: Normal serial troponins, CBC mild leukocytosis 11,000 on my interpretation  Imaging: CT head shows no acute intracranial hemorrhage, subarachnoid hemorrhage, brain tumor or evidence of trauma  Telemetry: EKG interpretation: Reviewed by self interpreted by ER attending, sinus rhythm rate of 68 with no acute ST changes.  Testing considered but not ordered: MRI, no acute neurological findings at this time.  Nature of Complaint: Acute  Admission vs Discharge: Discharge  Discussion: Initial HPI, physical exam, orders placed per Nandini Montero NP.  I assumed care pending CT results as well as serial troponins.  Lab work unremarkable on my interpretation.  I personally evaluated the patient, he is awake alert oriented x4 with no focal neurological deficit.  Speech is clear.  Patient answers questions appropriately response to all verbal commands.  Lab  work unremarkable.  Patient reported mild headache that improved with Compazine, Benadryl, Toradol but did complain of some dizziness with positional changes.  He was given meclizine with near complete resolution of the lightheadedness and dizziness.  CT scan on my interpretation no acute intracranial process, no acute intracranial hemorrhage, tumor or evidence of trauma.  Patient follow-up with primary care, neurology and possible ENT for recheck.     Discharge plan and instructions were discussed with the patient who verbalized understanding and is in agreement with the plan, all questions were answered at this time.  Patient is aware of signs symptoms that would require immediate return to the emergency room.  Patient understands importance of following up with primary care provider for further evaluation and worsening concerns as well as blood pressure recheck in the next 4 weeks.     Patient was discharged in improved stable condition with an upright steady gait.      The following portions of the patient's history were reviewed and updated as appropriate: allergies, current medications, past family history, past medical history, past social history, past surgical history and problem list.      Family History   Problem Relation Age of Onset   • Heart disease Father    • Hypertension Father        Past Medical History:   Diagnosis Date   • Hypertension    • Mixed hyperlipidemia 03/25/2021   • Pulmonary hypertension (HCC) 03/25/2021       Social History     Socioeconomic History   • Marital status:    Tobacco Use   • Smoking status: Former   • Smokeless tobacco: Never   Vaping Use   • Vaping Use: Never used   Substance and Sexual Activity   • Alcohol use: Not Currently   • Drug use: Never   • Sexual activity: Defer         Current Outpatient Medications:   •  amLODIPine (NORVASC) 2.5 MG tablet, Take 1 tablet by mouth Daily., Disp: 90 tablet, Rfl: 3  •  Aspirin 81 MG capsule, Take  by mouth Daily., Disp: ,  Rfl:   •  atorvastatin (LIPITOR) 20 MG tablet, Take 20 mg by mouth Daily., Disp: , Rfl:   •  Cholecalciferol (Vitamin D3) 10 MCG (400 UNIT) capsule, Take  by mouth Daily., Disp: , Rfl:   •  meclizine (ANTIVERT) 25 MG tablet, Take 1 tablet by mouth 3 (Three) Times a Day As Needed for Dizziness., Disp: 30 tablet, Rfl: 0  •  metoprolol succinate XL (TOPROL-XL) 25 MG 24 hr tablet, Take 1 tablet by mouth Daily., Disp: 90 tablet, Rfl: 3  •  Saw Palmetto, Serenoa repens, (SAW PALMETTO EXTRACT PO), Take 450 mg by mouth Daily., Disp: , Rfl:   •  vardenafil (LEVITRA) 20 MG tablet, Take 20 mg by mouth As Needed., Disp: , Rfl:   •  esomeprazole (nexIUM) 20 MG capsule, Take 20 mg by mouth Every Morning Before Breakfast., Disp: , Rfl:   •  gabapentin (NEURONTIN) 100 MG capsule, Take 100 mg by mouth Daily., Disp: , Rfl:   •  naproxen (NAPROSYN) 250 MG tablet, Take 1 tablet by mouth 2 (Two) Times a Day With Meals., Disp: 60 tablet, Rfl: 5  •  ondansetron ODT (ZOFRAN-ODT) 4 MG disintegrating tablet, As Needed., Disp: , Rfl:     Review of Systems   Constitutional: Positive for fatigue.   HENT: Positive for sinus pressure.    Eyes: Positive for visual disturbance.   Respiratory: Negative.    Cardiovascular: Negative.    Gastrointestinal: Negative.    Endocrine: Negative.    Genitourinary: Negative.    Musculoskeletal: Negative.    Skin: Negative.    Allergic/Immunologic: Negative.    Neurological: Positive for dizziness and light-headedness.   Hematological: Negative.    Psychiatric/Behavioral: Negative.         I have reviewed ROS completed by medical assistant.     Objective:    Neurologic Exam     Mental Status   Oriented to person, place, and time.   Speech: speech is normal     Cranial Nerves     CN III, IV, VI   Pupils are equal, round, and reactive to light.    Gait, Coordination, and Reflexes     Gait  Gait: normal    Coordination   Finger to nose coordination: normal  Tandem walking coordination: normal    Reflexes   Right  brachioradialis: 2+  Left brachioradialis: 2+  Right biceps: 2+  Left biceps: 2+  Right triceps: 2+  Left triceps: 2+  Right patellar: 2+  Left patellar: 2+  Right achilles: 2+  Left achilles: 2+      Physical Exam  Vitals reviewed.   Constitutional:       Appearance: Normal appearance. He is overweight.   HENT:      Head: Normocephalic and atraumatic.      Right Ear: Hearing normal.      Left Ear: Hearing normal.      Nose: Nose normal.      Mouth/Throat:      Lips: Pink.      Mouth: Mucous membranes are moist.   Eyes:      General: Lids are normal. No visual field deficit.     Extraocular Movements: Extraocular movements intact.      Right eye: Normal extraocular motion and no nystagmus.      Left eye: Normal extraocular motion and no nystagmus.      Pupils: Pupils are equal, round, and reactive to light.   Cardiovascular:      Rate and Rhythm: Normal rate and regular rhythm.      Pulses: Normal pulses.      Heart sounds: Normal heart sounds, S1 normal and S2 normal.   Pulmonary:      Effort: Pulmonary effort is normal.      Breath sounds: Normal breath sounds.   Musculoskeletal:         General: Normal range of motion.      Cervical back: Full passive range of motion without pain and normal range of motion.      Comments: 4/4 all extremities including BUE , dorsiflexion and plantar flexion bilat    Skin:     General: Skin is warm and dry.   Neurological:      Mental Status: He is alert and oriented to person, place, and time.      Cranial Nerves: Facial asymmetry (droop on right - Pt reports Historically ) present. No cranial nerve deficit or dysarthria.      Sensory: Sensation is intact. No sensory deficit.      Motor: Motor function is intact. No weakness, tremor, atrophy, abnormal muscle tone, seizure activity or pronator drift.      Coordination: Coordination is intact. Romberg sign negative. Coordination normal. Finger-Nose-Finger Test and Heel to Shin Test normal. Rapid alternating movements normal.       Gait: Gait is intact. Gait and tandem walk normal.      Deep Tendon Reflexes: Babinski sign absent on the right side. Babinski sign absent on the left side.      Reflex Scores:       Tricep reflexes are 2+ on the right side and 2+ on the left side.       Bicep reflexes are 2+ on the right side and 2+ on the left side.       Brachioradialis reflexes are 2+ on the right side and 2+ on the left side.       Patellar reflexes are 2+ on the right side and 2+ on the left side.       Achilles reflexes are 2+ on the right side and 2+ on the left side.  Psychiatric:         Attention and Perception: Attention normal.         Mood and Affect: Mood normal.         Speech: Speech normal.         Behavior: Behavior is cooperative.          Assessment/Plan:    Discussion: #1 the patient will be sent for an MRI of the brain with and without contrast.  He was also started on naproxen 251 tablet twice daily with food.  Hopefully the naproxen will control the headache.  The patient will be referred to ophthalmology as he is having blurred vision with the headaches.  The dizziness is reproduced with moving the head to the right or left which shows a possible vestibular cause, the patient will be referred to vestibular rehab.  He reports all his life he has had an irregular face shape and states that chronically he has trouble with glasses fitting due to this.  He will be scheduled back in follow-up in approximately 3 months and is to call if he has any questions or concerns prior to that time.    Diagnoses and all orders for this visit:    1. Other complicated headache syndrome (Primary)  Comments:  with visual symptoms   Orders:  -     MRI Brain With & Without Contrast; Future  -     naproxen (NAPROSYN) 250 MG tablet; Take 1 tablet by mouth 2 (Two) Times a Day With Meals.  Dispense: 60 tablet; Refill: 5    2. History of blurred vision  -     Ambulatory Referral to Ophthalmology    3. Dizziness  -     Ambulatory Referral to Physical  Therapy Vestibular        Return in about 3 months (around 5/6/2023) for Imani diamond available .    I spent 60 minutes caring for Renzo on this date of service. This time includes time spent by me in the following activities: obtaining and/or reviewing a separately obtained history, performing a medically appropriate examination and/or evaluation, counseling and educating the patient/family/caregiver, ordering medications, tests, or procedures, referring and communicating with other health care professionals and documenting information in the medical record.      This document has been electronically signed by Imani MARSHALL on February 6, 2023 09:30 EST

## 2023-02-06 ENCOUNTER — OFFICE VISIT (OUTPATIENT)
Dept: NEUROLOGY | Facility: CLINIC | Age: 58
End: 2023-02-06
Payer: COMMERCIAL

## 2023-02-06 VITALS
SYSTOLIC BLOOD PRESSURE: 138 MMHG | OXYGEN SATURATION: 96 % | WEIGHT: 230 LBS | BODY MASS INDEX: 32.2 KG/M2 | HEIGHT: 71 IN | DIASTOLIC BLOOD PRESSURE: 91 MMHG | HEART RATE: 72 BPM | TEMPERATURE: 98.2 F | RESPIRATION RATE: 12 BRPM

## 2023-02-06 DIAGNOSIS — Z86.69 HISTORY OF BLURRED VISION: ICD-10-CM

## 2023-02-06 DIAGNOSIS — R42 DIZZINESS: ICD-10-CM

## 2023-02-06 DIAGNOSIS — G44.59 OTHER COMPLICATED HEADACHE SYNDROME: Primary | ICD-10-CM

## 2023-02-06 PROCEDURE — 99205 OFFICE O/P NEW HI 60 MIN: CPT | Performed by: NURSE PRACTITIONER

## 2023-02-06 RX ORDER — NAPROXEN 250 MG/1
250 TABLET ORAL 2 TIMES DAILY WITH MEALS
Qty: 60 TABLET | Refills: 5 | Status: SHIPPED | OUTPATIENT
Start: 2023-02-06

## 2023-02-15 ENCOUNTER — OFFICE VISIT (OUTPATIENT)
Dept: CARDIOLOGY | Facility: CLINIC | Age: 58
End: 2023-02-15
Payer: COMMERCIAL

## 2023-02-15 VITALS
HEIGHT: 71 IN | SYSTOLIC BLOOD PRESSURE: 139 MMHG | HEART RATE: 70 BPM | DIASTOLIC BLOOD PRESSURE: 89 MMHG | OXYGEN SATURATION: 98 % | BODY MASS INDEX: 33.04 KG/M2 | WEIGHT: 236 LBS

## 2023-02-15 DIAGNOSIS — I10 ESSENTIAL HYPERTENSION: Primary | ICD-10-CM

## 2023-02-15 DIAGNOSIS — R55 POSTURAL DIZZINESS WITH PRESYNCOPE: ICD-10-CM

## 2023-02-15 DIAGNOSIS — R42 POSTURAL DIZZINESS WITH PRESYNCOPE: ICD-10-CM

## 2023-02-15 PROCEDURE — 99213 OFFICE O/P EST LOW 20 MIN: CPT | Performed by: INTERNAL MEDICINE

## 2023-02-15 NOTE — PROGRESS NOTES
Progress note      Name: Renzo Thompson ADMIT: (Not on file)   : 1965  PCP: Rosy Love APRN    MRN: [id number] LOS: 0 days   AGE/SEX: 58 y.o. male  ROOM: Room/bed info not found     Chief Complaint   Patient presents with   • Hypertension       Subjective       History of present illness  Renzo Thompson is a 58-year-old male patient with hypertension, dyslipidemia, family history of coronary artery disease, he is in today for follow-up.  Patient has been complaining of headaches and vertigo type symptoms and is being evaluated by neurology.  He did have some chest pain after taking naproxen but it seems to have subsided after he quit taking the medication.  Otherwise he has not been having any exertional chest pain or shortness of breath.    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    Head:      normocephalic and atraumatic.    Eyes:      PERRL/EOM intact, conjunctiva and sclera clear with out nystagmus.    Neck:      no masses, thyromegaly,  trachea central with normal respiratory effort and PMI displaced laterally  Lungs:      Clear to auscultation bilaterally  Heart:       Regular rate and rhythm  Msk:      no deformity or scoliosis noted of thoracic or lumbar spine.     Pulses:      pulses normal in all 4 extremities.    Extremities:       No lower extremity edema  Neurologic:      no focal deficits.   alert oriented x3  Skin:      intact without lesions or rashes.    Psych:      alert and cooperative; normal mood and affect; normal attention span and concentration.      Result Review :               Pertinent cardiac workup    1. EKG March 28, 2021 sinus rhythm normal EKG  2. Stress test 2/25/2021 inferior apical fixed defect, ejection fraction 58%  3. Echocardiogram 3/1/2021 ejection fraction 60%      Procedures        Assessment and Plan      Renzo Thompson is a 58-year-old male patient with hypertension, dyslipidemia, family history of CAD, here today for follow-up.  Patient is not having any exertional chest pain or shortness of breath, he is having vertigo and headaches but no concerning symptoms of CHF or obstructive CAD.  He had a stress test fairly recently which was low risk.  I will see him in follow-up in 1 year, or sooner if he has any issues.    Diagnoses and all orders for this visit:    1. Essential hypertension (Primary)    2. Postural dizziness with presyncope           No follow-ups on file.  Patient was given instructions and counseling regarding his condition or for health maintenance advice. Please see specific information pulled into the AVS if appropriate.

## 2023-02-17 ENCOUNTER — TREATMENT (OUTPATIENT)
Dept: PHYSICAL THERAPY | Facility: CLINIC | Age: 58
End: 2023-02-17
Payer: COMMERCIAL

## 2023-02-17 DIAGNOSIS — R42 DIZZINESS AND GIDDINESS: Primary | ICD-10-CM

## 2023-02-17 PROCEDURE — 97162 PT EVAL MOD COMPLEX 30 MIN: CPT | Performed by: PHYSICAL THERAPIST

## 2023-02-17 PROCEDURE — 95992 CANALITH REPOSITIONING PROC: CPT | Performed by: PHYSICAL THERAPIST

## 2023-02-17 NOTE — PROGRESS NOTES
Physical Therapy Initial Evaluation and Plan of Care    Patient: Renzo Thompson   : 1965  Diagnosis/ICD-10 Code:  Dizziness and giddiness [R42]  Referring practitioner: Imani Connelly, *  Date of Initial Visit: 2023  Today's Date: 2023  Patient seen for 1 sessions           Subjective Questionnaire: DHI: 16      Subjective Evaluation    History of Present Illness  Mechanism of injury: Pt is referred to therapy due to dizziness. Reports his symptoms started on , he was on vacation, got in a hot tub it was too hot. when he got back to the room he started having a HA, lightheaded, dizziness and blurry vision. Drove back home the next day.  Has been to ER a few times, seen his family Dr and a neurologist who sent him to therapy.  Pt denies any spinning , reports feeling lightheaded and having a HA and frontal pressure. Occasionally feels he is going to pass out.  Gets lightheaded. Has been sensitive to bright light and noise. Takes Meclizine as needed, takes BP and heart medication.  He hit his head at work last summer.     Aggravating factors: bright lights, loud noise, moving his head too fast side to kurt    Relieving factors: rest, taking Meclizine     Functional limitation: doesn't have any limitations , able to work and perform his normal activities, but the HA persist and hasn't been able to tolerate going out to restaurants or busy places due to his symptoms.     He is scheduled for a MRI of the brain    PMH: HTN, pulmonary HTN          Patient Occupation:  of life: good    Pain  Current pain ratin  At best pain ratin  At worst pain rating: 3  Location: HA  Progression: improved    Social Support  Lives with: spouse    Patient Goals  Patient goal: resolve dizziness         Precautions: none    Objective          Functional Assessment     Comments  Additional subjective information:   Vestibular testing completed:  none   Vision problems/correction: wears  glasses   Hearing problems: none   History of falls: none      Symptoms last a matter of:  Few secs   Symptoms feel like:  Lightheadedness, pressure   Concurrent symptoms:  HA      Objective:     Oculomotor and VOR:      Spontaneous nystagmus: deferred    Gaze-evoked nystagmus:    Skew deviation:    Head-shake nystagmus:    Smooth pursuit:    Saccades:    VORc:    Head thrust:  R/L        SVA:  deferred    DVA:  Horiz:              Vert:        Cervical AROM: wnl     Vertebral artery screen: neg B     Cerebellar function:  UE:  finger to nose: deferred                                                        JARON:                                     LE:  JARON:                                                       Heel to shin:     BPPV Assessment:    Roll Test:  Right: neg, inc symptoms upon sitting up from testing position           Left: mild dizziness, inc symptoms upon sitting up      Werner Hallpike:  Right: neg, inc symptoms upon sitting up from testing position                            Left: slight dizziness, delayed onset, no nystagmus,  inc symptoms upon sitting up from testing position worse than R side       MCTSIB:  cond 1: deferred  `                           cond 2:                              cond 3:                              cond 4:     Gait: normal gait pattern, no gait or balance instability    Treatment: pt received L Epley maneuver f/b post rx instructions for 24 hrs.  Initially after the rx his HA was worse but after a while it improved and the intensity improved.                        Assessment & Plan     Assessment  Impairments: activity intolerance    Assessment details: Pt is a 58 y.o. male referred to therapy due to dizziness, lightheadedness, frontal HA and pressure since Jan.   Pt presents with some sign/ symptoms of BPPV and vestibular dysfunction. L DHP was positive for subjective symptoms but no nystagmus.  Pt has been sensitive to bright light and loud noise and crowded places.   Upon initial evaluation pt exhibits the above impairments and functional limitations.    Pt is a good candidate for rehab and will benefit from skilled physical therapy to address impairments, resolve dizziness and maximize function.    Prognosis: good    Goals  Plan Goals: STGs: in 4 weeks    1- Pt will repot at least 50 % improvement and symptom reduction   2- Pt will be independent with initial  HEP if indicated  3- Assess balance and initiate balance activities if indicated      LTGs: By DC     1- Pt will report 100 % improvement and symptom reduction  2- Pt will be independent with self management of his condition   3- Pt will have improved DHI score indicating improved function and symptom reduction  4- Pt will be independent with final HEP if indicated by DC      Plan  Therapy options: will be seen for skilled therapy services  Planned therapy interventions: functional ROM exercises, home exercise program, neuromuscular re-education and therapeutic activities  Other planned therapy interventions: CRM  Frequency: 1x week  Duration in weeks: 12  Treatment plan discussed with: patient        See flow sheet for treatment detail    History # of Personal Factors and/or Comorbidities: MODERATE (1-2)  Examination of Body System(s): # of elements: MODERATE (3)  Clinical Presentation: EVOLVING  Clinical Decision Making: MODERATE          Timed:         Manual Therapy:         mins  40311;     Therapeutic Exercise:         mins  07719;     Neuromuscular Parth:        mins  74150;    Therapeutic Activity:          mins  71737;     Gait Training:           mins  67211;     Ultrasound:          mins  92815;    Ionto                                   mins   87996  Self Care                            mins   89826  Canal repositioning       15    mins    89776      Un-Timed:  Electrical Stimulation:         mins  39588 ( );  Dry Needling          mins self-pay  Traction          mins 54223  Low Eval          Mins   56498  Mod Eval    40      Mins  40240  High Eval                            Mins  86294  Re-Eval                               mins  48554        Timed Treatment:  15    mins   Total Treatment:    55    mins    PT SIGNATURE: Hipolito Monge PT, CLWINDY  [place] License: # [id number]     DATE TREATMENT INITIATED: 2/17/2023    Initial Certification  Certification Period: 2/17/2023 thru 5/17/2023  I certify that the therapy services are furnished while this patient is under my care.  The services outlined above are required by this patient, and will be reviewed every 90 days.     PHYSICIAN: Imani Connelly, JANY   NPI: [id number]                                      DATE:     Please sign and return via fax to [phone].. Thank you, [facility] Physical Therapy.

## 2023-02-24 ENCOUNTER — TREATMENT (OUTPATIENT)
Dept: PHYSICAL THERAPY | Facility: CLINIC | Age: 58
End: 2023-02-24
Payer: COMMERCIAL

## 2023-02-24 DIAGNOSIS — R42 DIZZINESS AND GIDDINESS: Primary | ICD-10-CM

## 2023-02-24 PROCEDURE — 97112 NEUROMUSCULAR REEDUCATION: CPT | Performed by: PHYSICAL THERAPIST

## 2023-02-24 PROCEDURE — 95992 CANALITH REPOSITIONING PROC: CPT | Performed by: PHYSICAL THERAPIST
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
## 2023-03-02 ENCOUNTER — HOSPITAL ENCOUNTER (OUTPATIENT)
Dept: MRI IMAGING | Facility: HOSPITAL | Age: 58
Discharge: HOME | End: 2023-03-02
Admitting: NURSE PRACTITIONER
Payer: COMMERCIAL

## 2023-03-02 DIAGNOSIS — G44.59 OTHER COMPLICATED HEADACHE SYNDROME: ICD-10-CM

## 2023-03-02 LAB
CREAT BLDA-MCNC: 1 MG/DL (ref 0.6–1.3)
EGFRCR SERPLBLD CKD-EPI 2021: 88.3 ML/MIN/1.73

## 2023-03-02 PROCEDURE — 82565 ASSAY OF CREATININE: CPT

## 2023-03-02 PROCEDURE — A9579 GAD-BASE MR CONTRAST NOS,1ML: HCPCS | Performed by: NURSE PRACTITIONER

## 2023-03-02 PROCEDURE — 25010000002 GADOTERIDOL PER 1 ML: Performed by: NURSE PRACTITIONER

## 2023-03-02 PROCEDURE — 70553 MRI BRAIN STEM W/O & W/DYE: CPT

## 2023-03-02 RX ADMIN — GADOTERIDOL 20 ML: 279.3 INJECTION, SOLUTION INTRAVENOUS at 12:17

## 2023-03-06 RX ORDER — METOPROLOL SUCCINATE 25 MG/1
TABLET, EXTENDED RELEASE ORAL
Qty: 90 TABLET | Refills: 3 | Status: SHIPPED | OUTPATIENT
Start: 2023-03-06

## 2023-03-06 NOTE — TELEPHONE ENCOUNTER
Rx Refill Note  Requested Prescriptions     Pending Prescriptions Disp Refills   • metoprolol succinate XL (TOPROL-XL) 25 MG 24 hr tablet [Pharmacy Med Name: METOPROLOL SUCC ER 25 MG TAB] 90 tablet 3     Sig: TAKE 1 TABLET BY MOUTH EVERY DAY      Last office visit with prescribing clinician: 2/15/2023   Last telemedicine visit with prescribing clinician: Visit date not found   Next office visit with prescribing clinician: Visit date not found   {    Luann Guevara MA  03/06/23, 08:09 EST

## 2023-04-03 RX ORDER — ATORVASTATIN CALCIUM 20 MG/1
20 TABLET, FILM COATED ORAL DAILY
Qty: 90 TABLET | Refills: 3 | Status: SHIPPED | OUTPATIENT
Start: 2023-04-03

## 2023-04-03 NOTE — TELEPHONE ENCOUNTER
Rx Refill Note  Requested Prescriptions      No prescriptions requested or ordered in this encounter      Last office visit with prescribing clinician: 2/15/2023   Next office visit with prescribing clinician: f/u in [place]                         Would you like a call back once the refill request has been completed: [] Yes [] No    If the office needs to give you a call back, can they leave a voicemail: [] Yes [] No    Minerva Pan MA  04/03/23, 09:13 EDT

## 2023-09-13 ENCOUNTER — APPOINTMENT (OUTPATIENT)
Dept: CT IMAGING | Facility: HOSPITAL | Age: 58
End: 2023-09-13
Payer: COMMERCIAL

## 2023-09-13 ENCOUNTER — HOSPITAL ENCOUNTER (OUTPATIENT)
Facility: HOSPITAL | Age: 58
Setting detail: OBSERVATION
Discharge: HOME | End: 2023-09-14
Attending: EMERGENCY MEDICINE | Admitting: EMERGENCY MEDICINE
Payer: COMMERCIAL

## 2023-09-13 ENCOUNTER — APPOINTMENT (OUTPATIENT)
Dept: NUCLEAR MEDICINE | Facility: HOSPITAL | Age: 58
End: 2023-09-13
Payer: COMMERCIAL

## 2023-09-13 ENCOUNTER — APPOINTMENT (OUTPATIENT)
Dept: GENERAL RADIOLOGY | Facility: HOSPITAL | Age: 58
End: 2023-09-13
Payer: COMMERCIAL

## 2023-09-13 DIAGNOSIS — R06.02 SHORTNESS OF BREATH: Primary | ICD-10-CM

## 2023-09-13 DIAGNOSIS — F41.0 PANIC ATTACK: ICD-10-CM

## 2023-09-13 DIAGNOSIS — R42 DIZZY: ICD-10-CM

## 2023-09-13 LAB
ALBUMIN SERPL-MCNC: 4.5 G/DL (ref 3.5–5.2)
ALBUMIN/GLOB SERPL: 2.3 G/DL
ALP SERPL-CCNC: 69 U/L (ref 39–117)
ALT SERPL W P-5'-P-CCNC: 16 U/L (ref 1–41)
ANION GAP SERPL CALCULATED.3IONS-SCNC: 11 MMOL/L (ref 5–15)
AST SERPL-CCNC: 22 U/L (ref 1–40)
BACTERIA UR QL AUTO: ABNORMAL /HPF
BASOPHILS # BLD AUTO: 0.1 10*3/MM3 (ref 0–0.2)
BASOPHILS NFR BLD AUTO: 0.7 % (ref 0–1.5)
BH CV NUCLEAR PRIOR STUDY: 3
BH CV REST NUCLEAR ISOTOPE DOSE: 11 MCI
BH CV STRESS BP STAGE 1: NORMAL
BH CV STRESS COMMENTS STAGE 1: NORMAL
BH CV STRESS DOSE REGADENOSON STAGE 1: 0.4
BH CV STRESS DURATION MIN STAGE 1: 0
BH CV STRESS DURATION SEC STAGE 1: 10
BH CV STRESS HR STAGE 1: 102
BH CV STRESS NUCLEAR ISOTOPE DOSE: 31.5 MCI
BH CV STRESS PROTOCOL 1: NORMAL
BH CV STRESS RECOVERY BP: NORMAL MMHG
BH CV STRESS RECOVERY HR: 81 BPM
BH CV STRESS STAGE 1: 1
BILIRUB SERPL-MCNC: 1.8 MG/DL (ref 0–1.2)
BILIRUB UR QL STRIP: NEGATIVE
BUN SERPL-MCNC: 12 MG/DL (ref 6–20)
BUN/CREAT SERPL: 11.9 (ref 7–25)
CALCIUM SPEC-SCNC: 9.7 MG/DL (ref 8.6–10.5)
CHLORIDE SERPL-SCNC: 104 MMOL/L (ref 98–107)
CHOLEST SERPL-MCNC: 93 MG/DL (ref 0–200)
CK SERPL-CCNC: 197 U/L (ref 20–200)
CLARITY UR: CLEAR
CO2 SERPL-SCNC: 24 MMOL/L (ref 22–29)
COLOR UR: YELLOW
CREAT SERPL-MCNC: 1.01 MG/DL (ref 0.76–1.27)
D DIMER PPP FEU-MCNC: <0.19 MG/L (FEU) (ref 0–0.56)
DEPRECATED RDW RBC AUTO: 40.7 FL (ref 37–54)
EGFRCR SERPLBLD CKD-EPI 2021: 87.3 ML/MIN/1.73
EOSINOPHIL # BLD AUTO: 0 10*3/MM3 (ref 0–0.4)
EOSINOPHIL NFR BLD AUTO: 0.4 % (ref 0.3–6.2)
ERYTHROCYTE [DISTWIDTH] IN BLOOD BY AUTOMATED COUNT: 12.5 % (ref 12.3–15.4)
ERYTHROCYTE [SEDIMENTATION RATE] IN BLOOD: 1 MM/HR (ref 0–20)
GEN 5 2HR TROPONIN T REFLEX: 8 NG/L
GLOBULIN UR ELPH-MCNC: 2 GM/DL
GLUCOSE BLDC GLUCOMTR-MCNC: 88 MG/DL (ref 70–105)
GLUCOSE SERPL-MCNC: 112 MG/DL (ref 65–99)
GLUCOSE UR STRIP-MCNC: NEGATIVE MG/DL
HCT VFR BLD AUTO: 45.6 % (ref 37.5–51)
HDLC SERPL-MCNC: 36 MG/DL (ref 40–60)
HGB BLD-MCNC: 15.8 G/DL (ref 13–17.7)
HGB UR QL STRIP.AUTO: NEGATIVE
HOLD SPECIMEN: NORMAL
HYALINE CASTS UR QL AUTO: ABNORMAL /LPF
KETONES UR QL STRIP: ABNORMAL
LDLC SERPL CALC-MCNC: 44 MG/DL (ref 0–100)
LDLC/HDLC SERPL: 1.3 {RATIO}
LEUKOCYTE ESTERASE UR QL STRIP.AUTO: ABNORMAL
LIPASE SERPL-CCNC: 46 U/L (ref 13–60)
LV EF NUC BP: 69 %
LYMPHOCYTES # BLD AUTO: 2.3 10*3/MM3 (ref 0.7–3.1)
LYMPHOCYTES NFR BLD AUTO: 20.8 % (ref 19.6–45.3)
MAGNESIUM SERPL-MCNC: 2 MG/DL (ref 1.6–2.6)
MAXIMAL PREDICTED HEART RATE: 164 BPM
MCH RBC QN AUTO: 31.4 PG (ref 26.6–33)
MCHC RBC AUTO-ENTMCNC: 34.7 G/DL (ref 31.5–35.7)
MCV RBC AUTO: 90.5 FL (ref 79–97)
MONOCYTES # BLD AUTO: 0.6 10*3/MM3 (ref 0.1–0.9)
MONOCYTES NFR BLD AUTO: 5.7 % (ref 5–12)
NEUTROPHILS NFR BLD AUTO: 72.4 % (ref 42.7–76)
NEUTROPHILS NFR BLD AUTO: 8.2 10*3/MM3 (ref 1.7–7)
NITRITE UR QL STRIP: NEGATIVE
NRBC BLD AUTO-RTO: 0.1 /100 WBC (ref 0–0.2)
PERCENT MAX PREDICTED HR: 57.32 %
PH UR STRIP.AUTO: 7 [PH] (ref 5–8)
PLATELET # BLD AUTO: 227 10*3/MM3 (ref 140–450)
PMV BLD AUTO: 7.6 FL (ref 6–12)
POTASSIUM SERPL-SCNC: 4.3 MMOL/L (ref 3.5–5.2)
PROT SERPL-MCNC: 6.5 G/DL (ref 6–8.5)
PROT UR QL STRIP: NEGATIVE
RBC # BLD AUTO: 5.04 10*6/MM3 (ref 4.14–5.8)
RBC # UR STRIP: ABNORMAL /HPF
REF LAB TEST METHOD: ABNORMAL
SODIUM SERPL-SCNC: 139 MMOL/L (ref 136–145)
SP GR UR STRIP: 1.02 (ref 1–1.03)
SQUAMOUS #/AREA URNS HPF: ABNORMAL /HPF
STRESS BASELINE BP: NORMAL MMHG
STRESS BASELINE HR: 64 BPM
STRESS PERCENT HR: 67 %
STRESS POST PEAK BP: NORMAL MMHG
STRESS POST PEAK HR: 94 BPM
STRESS TARGET HR: 139 BPM
TRIGL SERPL-MCNC: 51 MG/DL (ref 0–150)
TROPONIN T DELTA: 0 NG/L
TROPONIN T SERPL HS-MCNC: 8 NG/L
TROPONIN T SERPL HS-MCNC: 9 NG/L
TSH SERPL DL<=0.05 MIU/L-ACNC: 1.03 UIU/ML (ref 0.27–4.2)
UROBILINOGEN UR QL STRIP: ABNORMAL
VLDLC SERPL-MCNC: 13 MG/DL (ref 5–40)
WBC # UR STRIP: ABNORMAL /HPF
WBC NRBC COR # BLD: 11.3 10*3/MM3 (ref 3.4–10.8)

## 2023-09-13 PROCEDURE — G0378 HOSPITAL OBSERVATION PER HR: HCPCS

## 2023-09-13 PROCEDURE — 93018 CV STRESS TEST I&R ONLY: CPT | Performed by: INTERNAL MEDICINE

## 2023-09-13 PROCEDURE — 84484 ASSAY OF TROPONIN QUANT: CPT | Performed by: PHYSICIAN ASSISTANT

## 2023-09-13 PROCEDURE — 78452 HT MUSCLE IMAGE SPECT MULT: CPT | Performed by: INTERNAL MEDICINE

## 2023-09-13 PROCEDURE — 0 TECHNETIUM TETROFOSMIN KIT: Performed by: EMERGENCY MEDICINE

## 2023-09-13 PROCEDURE — 93017 CV STRESS TEST TRACING ONLY: CPT

## 2023-09-13 PROCEDURE — 83690 ASSAY OF LIPASE: CPT | Performed by: EMERGENCY MEDICINE

## 2023-09-13 PROCEDURE — 36415 COLL VENOUS BLD VENIPUNCTURE: CPT | Performed by: PHYSICIAN ASSISTANT

## 2023-09-13 PROCEDURE — 83735 ASSAY OF MAGNESIUM: CPT | Performed by: EMERGENCY MEDICINE

## 2023-09-13 PROCEDURE — 71045 X-RAY EXAM CHEST 1 VIEW: CPT

## 2023-09-13 PROCEDURE — 82948 REAGENT STRIP/BLOOD GLUCOSE: CPT

## 2023-09-13 PROCEDURE — 84443 ASSAY THYROID STIM HORMONE: CPT | Performed by: EMERGENCY MEDICINE

## 2023-09-13 PROCEDURE — 80053 COMPREHEN METABOLIC PANEL: CPT | Performed by: EMERGENCY MEDICINE

## 2023-09-13 PROCEDURE — 70450 CT HEAD/BRAIN W/O DYE: CPT

## 2023-09-13 PROCEDURE — 82550 ASSAY OF CK (CPK): CPT | Performed by: EMERGENCY MEDICINE

## 2023-09-13 PROCEDURE — 93005 ELECTROCARDIOGRAM TRACING: CPT | Performed by: EMERGENCY MEDICINE

## 2023-09-13 PROCEDURE — 85025 COMPLETE CBC W/AUTO DIFF WBC: CPT | Performed by: EMERGENCY MEDICINE

## 2023-09-13 PROCEDURE — 99284 EMERGENCY DEPT VISIT MOD MDM: CPT

## 2023-09-13 PROCEDURE — 85379 FIBRIN DEGRADATION QUANT: CPT | Performed by: EMERGENCY MEDICINE

## 2023-09-13 PROCEDURE — 78452 HT MUSCLE IMAGE SPECT MULT: CPT

## 2023-09-13 PROCEDURE — 85652 RBC SED RATE AUTOMATED: CPT | Performed by: EMERGENCY MEDICINE

## 2023-09-13 PROCEDURE — 25010000002 REGADENOSON 0.4 MG/5ML SOLUTION: Performed by: EMERGENCY MEDICINE

## 2023-09-13 PROCEDURE — A9502 TC99M TETROFOSMIN: HCPCS | Performed by: EMERGENCY MEDICINE

## 2023-09-13 PROCEDURE — 80061 LIPID PANEL: CPT | Performed by: PHYSICIAN ASSISTANT

## 2023-09-13 PROCEDURE — 84484 ASSAY OF TROPONIN QUANT: CPT | Performed by: EMERGENCY MEDICINE

## 2023-09-13 PROCEDURE — 81001 URINALYSIS AUTO W/SCOPE: CPT | Performed by: EMERGENCY MEDICINE

## 2023-09-13 RX ORDER — ACETAMINOPHEN 325 MG/1
650 TABLET ORAL EVERY 6 HOURS PRN
COMMUNITY

## 2023-09-13 RX ORDER — SODIUM CHLORIDE 9 MG/ML
40 INJECTION, SOLUTION INTRAVENOUS AS NEEDED
Status: DISCONTINUED | OUTPATIENT
Start: 2023-09-13 | End: 2023-09-14 | Stop reason: HOSPADM

## 2023-09-13 RX ORDER — ENOXAPARIN SODIUM 100 MG/ML
40 INJECTION SUBCUTANEOUS EVERY 24 HOURS
Status: DISCONTINUED | OUTPATIENT
Start: 2023-09-13 | End: 2023-09-14 | Stop reason: HOSPADM

## 2023-09-13 RX ORDER — ASPIRIN 81 MG/1
81 TABLET, CHEWABLE ORAL DAILY
Status: DISCONTINUED | OUTPATIENT
Start: 2023-09-13 | End: 2023-09-14 | Stop reason: HOSPADM

## 2023-09-13 RX ORDER — TRIAMCINOLONE ACETONIDE 55 UG/1
2 SPRAY, METERED NASAL DAILY
COMMUNITY

## 2023-09-13 RX ORDER — METOPROLOL SUCCINATE 25 MG/1
25 TABLET, EXTENDED RELEASE ORAL DAILY
Status: DISCONTINUED | OUTPATIENT
Start: 2023-09-13 | End: 2023-09-14 | Stop reason: HOSPADM

## 2023-09-13 RX ORDER — ATORVASTATIN CALCIUM 20 MG/1
20 TABLET, FILM COATED ORAL NIGHTLY
Status: DISCONTINUED | OUTPATIENT
Start: 2023-09-13 | End: 2023-09-14 | Stop reason: HOSPADM

## 2023-09-13 RX ORDER — SODIUM CHLORIDE 0.9 % (FLUSH) 0.9 %
10 SYRINGE (ML) INJECTION EVERY 12 HOURS SCHEDULED
Status: DISCONTINUED | OUTPATIENT
Start: 2023-09-13 | End: 2023-09-14 | Stop reason: HOSPADM

## 2023-09-13 RX ORDER — SODIUM CHLORIDE 0.9 % (FLUSH) 0.9 %
10 SYRINGE (ML) INJECTION AS NEEDED
Status: DISCONTINUED | OUTPATIENT
Start: 2023-09-13 | End: 2023-09-14 | Stop reason: HOSPADM

## 2023-09-13 RX ORDER — AMOXICILLIN 250 MG
2 CAPSULE ORAL 2 TIMES DAILY
Status: DISCONTINUED | OUTPATIENT
Start: 2023-09-13 | End: 2023-09-14 | Stop reason: HOSPADM

## 2023-09-13 RX ORDER — BISACODYL 5 MG/1
5 TABLET, DELAYED RELEASE ORAL DAILY PRN
Status: DISCONTINUED | OUTPATIENT
Start: 2023-09-13 | End: 2023-09-14 | Stop reason: HOSPADM

## 2023-09-13 RX ORDER — METOPROLOL SUCCINATE 25 MG/1
25 TABLET, EXTENDED RELEASE ORAL DAILY
COMMUNITY

## 2023-09-13 RX ORDER — SODIUM CHLORIDE 9 MG/ML
40 INJECTION, SOLUTION INTRAVENOUS AS NEEDED
Status: DISCONTINUED | OUTPATIENT
Start: 2023-09-13 | End: 2023-09-13

## 2023-09-13 RX ORDER — REGADENOSON 0.08 MG/ML
0.4 INJECTION, SOLUTION INTRAVENOUS
Status: COMPLETED | OUTPATIENT
Start: 2023-09-13 | End: 2023-09-13

## 2023-09-13 RX ORDER — AMLODIPINE BESYLATE 2.5 MG/1
2.5 TABLET ORAL DAILY
Status: DISCONTINUED | OUTPATIENT
Start: 2023-09-13 | End: 2023-09-14 | Stop reason: HOSPADM

## 2023-09-13 RX ORDER — ONDANSETRON 2 MG/ML
4 INJECTION INTRAMUSCULAR; INTRAVENOUS EVERY 6 HOURS PRN
Status: DISCONTINUED | OUTPATIENT
Start: 2023-09-13 | End: 2023-09-14 | Stop reason: HOSPADM

## 2023-09-13 RX ORDER — ALPRAZOLAM 0.5 MG/1
0.5 TABLET ORAL EVERY 6 HOURS PRN
Status: DISCONTINUED | OUTPATIENT
Start: 2023-09-13 | End: 2023-09-14 | Stop reason: HOSPADM

## 2023-09-13 RX ORDER — CHOLECALCIFEROL (VITAMIN D3) 125 MCG
5 CAPSULE ORAL NIGHTLY PRN
Status: DISCONTINUED | OUTPATIENT
Start: 2023-09-13 | End: 2023-09-14 | Stop reason: HOSPADM

## 2023-09-13 RX ORDER — ONDANSETRON 4 MG/1
4 TABLET, FILM COATED ORAL EVERY 6 HOURS PRN
Status: DISCONTINUED | OUTPATIENT
Start: 2023-09-13 | End: 2023-09-14 | Stop reason: HOSPADM

## 2023-09-13 RX ORDER — POLYETHYLENE GLYCOL 3350 17 G/17G
17 POWDER, FOR SOLUTION ORAL DAILY PRN
Status: DISCONTINUED | OUTPATIENT
Start: 2023-09-13 | End: 2023-09-14 | Stop reason: HOSPADM

## 2023-09-13 RX ORDER — BISACODYL 10 MG
10 SUPPOSITORY, RECTAL RECTAL DAILY PRN
Status: DISCONTINUED | OUTPATIENT
Start: 2023-09-13 | End: 2023-09-14 | Stop reason: HOSPADM

## 2023-09-13 RX ORDER — SODIUM CHLORIDE 0.9 % (FLUSH) 0.9 %
10 SYRINGE (ML) INJECTION AS NEEDED
Status: DISCONTINUED | OUTPATIENT
Start: 2023-09-13 | End: 2023-09-13

## 2023-09-13 RX ADMIN — METOPROLOL SUCCINATE 25 MG: 25 TABLET, EXTENDED RELEASE ORAL at 14:05

## 2023-09-13 RX ADMIN — REGADENOSON 0.4 MG: 0.08 INJECTION, SOLUTION INTRAVENOUS at 09:03

## 2023-09-13 RX ADMIN — ALPRAZOLAM 0.5 MG: 0.5 TABLET ORAL at 18:25

## 2023-09-13 RX ADMIN — TETROFOSMIN 1 DOSE: 1.38 INJECTION, POWDER, LYOPHILIZED, FOR SOLUTION INTRAVENOUS at 09:03

## 2023-09-13 RX ADMIN — Medication 10 ML: at 20:34

## 2023-09-13 RX ADMIN — SODIUM CHLORIDE, POTASSIUM CHLORIDE, SODIUM LACTATE AND CALCIUM CHLORIDE 1000 ML: 600; 310; 30; 20 INJECTION, SOLUTION INTRAVENOUS at 06:07

## 2023-09-13 RX ADMIN — ATORVASTATIN CALCIUM 20 MG: 20 TABLET, FILM COATED ORAL at 20:34

## 2023-09-13 RX ADMIN — TETROFOSMIN 1 DOSE: 1.38 INJECTION, POWDER, LYOPHILIZED, FOR SOLUTION INTRAVENOUS at 07:54

## 2023-09-13 RX ADMIN — ASPIRIN 81 MG: 81 TABLET, CHEWABLE ORAL at 14:05

## 2023-09-13 RX ADMIN — AMLODIPINE BESYLATE 2.5 MG: 2.5 TABLET ORAL at 14:05

## 2023-09-13 RX ADMIN — Medication 10 ML: at 14:05

## 2023-09-13 NOTE — ED NOTES
Nursing report ED to floor  Renzo Thompson  58 y.o.  male    HPI:   Chief Complaint   Patient presents with    Shortness of Breath       Admitting doctor:   Jimmy Crum MD    Admitting diagnosis:   The primary encounter diagnosis was Shortness of breath. A diagnosis of Dizzy was also pertinent to this visit.    Code status:   Current Code Status       Date Active Code Status Order ID Comments User Context       9/13/2023 0711 CPR (Attempt to Resuscitate) [id number]  Satya Merino PA-C ED        Question Answer    Code Status (Patient has no pulse and is not breathing) CPR (Attempt to Resuscitate)    Medical Interventions (Patient has pulse or is breathing) Full Support                    Allergies:   Penicillins    Isolation:  No active isolations     Fall Risk:  Fall Risk Assessment was completed, and patient is at high risk for falls.   Predictive Model Details         6 (Low) Factor Value    Calculated 9/13/2023 10:08 Age 58    Risk of Fall Model Musculoskeletal Assessment WDL     Active Peripheral IV Present     Imaging order in this encounter Present     Respiratory Rate 20     Skin Assessment WDL     Magnesium 2 mg/dL     Drug Use No     Tobacco Use Quit     Total Bilirubin 1.8 mg/dL     El Scale not on file     Number of Distinct Medication Classes administered 2     Financial Class Private Insurance     Peripheral Vascular Assessment WDL     Clinically Relevant Sex Not Female     Diastolic BP 87     Cardiac Assessment WDL     Gastrointestinal Assessment X     Days after Admission 0.19     Potassium 4.3 mmol/L     Albumin 4.5 g/dL     Chloride 104 mmol/L     Calcium 9.7 mg/dL     Creatinine 1.01 mg/dL     ALT 16 U/L         Weight:       09/13/23  0532   Weight: 101 kg (222 lb 10.6 oz)       Intake and Output    Intake/Output Summary (Last 24 hours) at 9/13/2023 1253  Last data filed at 9/13/2023 0913  Gross per 24 hour   Intake 1000 ml   Output --   Net 1000 ml       Diet:   Dietary Orders (From  admission, onward)       Start     Ordered    09/13/23 1253  Diet: Cardiac Diets; Healthy Heart (2-3 Na+); Texture: Regular Texture (IDDSI 7); Fluid Consistency: Thin (IDDSI 0)  Diet Effective Now        References:    Diet Order [place]   Question Answer Comment   Diets: Cardiac Diets    Cardiac Diet: Healthy Heart (2-3 Na+)    Texture: Regular Texture (IDDSI 7)    Fluid Consistency: Thin (IDDSI 0)        09/13/23 1252                     Most recent vitals:   Vitals:    09/13/23 0656 09/13/23 0701 09/13/23 0800 09/13/23 0821   BP:  140/87 148/87    BP Location:       Patient Position:       Pulse: 57 64 58 65   Resp:       Temp:       TempSrc:       SpO2: 96% 98% 99% 96%   Weight:       Height:           Active LDAs/IV Access:   Lines, Drains & Airways       Active LDAs       Name Placement date Placement time Site Days    Peripheral IV 09/13/23 0606 Right Antecubital 09/13/23  0606  Antecubital  less than 1                    Skin Condition:   Skin Assessments (last day)       None             Labs (abnormal labs have a star):   Labs Reviewed   COMPREHENSIVE METABOLIC PANEL - Abnormal; Notable for the following components:       Result Value    Glucose 112 (*)     Total Bilirubin 1.8 (*)     All other components within normal limits    Narrative:     GFR Normal >60  Chronic Kidney Disease <60  Kidney Failure <15     URINALYSIS W/ MICROSCOPIC IF INDICATED (NO CULTURE) - Abnormal; Notable for the following components:    Ketones, UA 40 mg/dL (2+) (*)     Leuk Esterase, UA Trace (*)     All other components within normal limits   CBC WITH AUTO DIFFERENTIAL - Abnormal; Notable for the following components:    WBC 11.30 (*)     Neutrophils, Absolute 8.20 (*)     All other components within normal limits   URINALYSIS, MICROSCOPIC ONLY - Abnormal; Notable for the following components:    RBC, UA 0-2 (*)     WBC, UA 0-2 (*)     All other components within normal limits   LIPID PANEL - Abnormal; Notable for the  following components:    HDL Cholesterol 36 (*)     All other components within normal limits    Narrative:     Cholesterol Reference Ranges  (U.S. Department of Health and Human Services ATP III Classifications)    Desirable          <200 mg/dL  Borderline High    200-239 mg/dL  High Risk          >240 mg/dL      Triglyceride Reference Ranges  (U.S. Department of Health and Human Services ATP III Classifications)    Normal           <150 mg/dL  Borderline High  150-199 mg/dL  High             200-499 mg/dL  Very High        >500 mg/dL    HDL Reference Ranges  (U.S. Department of Health and Human Services ATP III Classifications)    Low     <40 mg/dl (major risk factor for CHD)  High    >60 mg/dl ('negative' risk factor for CHD)        LDL Reference Ranges  (U.S. Department of Health and Human Services ATP III Classifications)    Optimal          <100 mg/dL  Near Optimal     100-129 mg/dL  Borderline High  130-159 mg/dL  High             160-189 mg/dL  Very High        >189 mg/dL   LIPASE - Normal   SINGLE HSTROPONIN T - Normal    Narrative:     High Sensitive Troponin T Reference Range:  <10.0 ng/L- Negative Female for AMI  <15.0 ng/L- Negative Male for AMI  >=10 - Abnormal Female indicating possible myocardial injury.  >=15 - Abnormal Male indicating possible myocardial injury.   Clinicians would have to utilize clinical acumen, EKG, Troponin, and serial changes to determine if it is an Acute Myocardial Infarction or myocardial injury due to an underlying chronic condition.        D-DIMER, QUANTITATIVE - Normal    Narrative:     According to the assay 's published package insert, a normal (<0.50 mg/L (FEU)) D-dimer result in conjunction with a non-high clinical probability assessment, excludes deep vein thrombosis (DVT) and pulmonary embolism (PE) with high sensitivity.    D-dimer values increase with age and this can make VTE exclusion of an older population difficult. To address this, the American  "College of Physicians, based on best available evidence and recent guidelines, recommends that clinicians use age-adjusted D-dimer thresholds in patients greater than 50 years of age with: a) a low probability of PE who do not meet all Pulmonary Embolism Rule Out Criteria, or b) in those with intermediate probability of PE.   The formula for an age-adjusted D-dimer cut-off is \"age/100\".  For example, a 60 year old patient would have an age-adjusted cut-off of 0.60 mg/L (FEU) and an 80 year old 0.80 mg/L (FEU).   SEDIMENTATION RATE - Normal   TSH - Normal   MAGNESIUM - Normal   CK - Normal   TROPONIN - Normal    Narrative:     High Sensitive Troponin T Reference Range:  <10.0 ng/L- Negative Female for AMI  <15.0 ng/L- Negative Male for AMI  >=10 - Abnormal Female indicating possible myocardial injury.  >=15 - Abnormal Male indicating possible myocardial injury.   Clinicians would have to utilize clinical acumen, EKG, Troponin, and serial changes to determine if it is an Acute Myocardial Infarction or myocardial injury due to an underlying chronic condition.        HIGH SENSITIVITIY TROPONIN T 2HR   CBC AND DIFFERENTIAL    Narrative:     The following orders were created for panel order CBC & Differential.  Procedure                               Abnormality         Status                     ---------                               -----------         ------                     CBC Auto Differential[[id number]]        Abnormal            Final result                 Please view results for these tests on the individual orders.   EXTRA TUBES    Narrative:     The following orders were created for panel order Extra Tubes.  Procedure                               Abnormality         Status                     ---------                               -----------         ------                     Gold Top - SST[[id number]]                                   Final result                 Please view results for these tests on " the individual orders.   GOLD TOP - [facility]       LOC: Person, Place, Time, and Situation    Telemetry:  Observation Unit    Cardiac Monitoring Ordered: yes    EKG:   ECG 12 Lead Dyspnea   Preliminary Result   HEART RATE= 65  bpm   RR Interval= 928  ms   WV Interval= 165  ms   P Horizontal Axis= 13  deg   P Front Axis= 69  deg   QRSD Interval= 92  ms   QT Interval= 407  ms   QTcB= 422  ms   QRS Axis= 47  deg   T Wave Axis= 13  deg   - NORMAL ECG -   Sinus rhythm   When compared with ECG of 26-Jan-2023 14:48:24,   No significant change   Electronically Signed By:    Date and Time of Study: 2023-09-13 05:55:29          Medications Given in the ED:   Medications   sodium chloride 0.9 % flush 10 mL (has no administration in time range)   sodium chloride 0.9 % flush 10 mL (10 mL Intravenous Not Given 9/13/23 0913)   sodium chloride 0.9 % flush 10 mL (has no administration in time range)   sodium chloride 0.9 % infusion 40 mL (has no administration in time range)   ondansetron (ZOFRAN) tablet 4 mg (has no administration in time range)     Or   ondansetron (ZOFRAN) injection 4 mg (has no administration in time range)   melatonin tablet 5 mg (has no administration in time range)   Enoxaparin Sodium (LOVENOX) syringe 40 mg (has no administration in time range)   Potassium Replacement - Follow Nurse / BPA Driven Protocol (has no administration in time range)   Magnesium Standard Dose Replacement - Follow Nurse / BPA Driven Protocol (has no administration in time range)   Phosphorus Replacement - Follow Nurse / BPA Driven Protocol (has no administration in time range)   Calcium Replacement - Follow Nurse / BPA Driven Protocol (has no administration in time range)   sennosides-docusate (PERICOLACE) 8.6-50 MG per tablet 2 tablet (2 tablets Oral Not Given 9/13/23 1018)     And   polyethylene glycol (MIRALAX) packet 17 g (has no administration in time range)     And   bisacodyl (DULCOLAX) EC tablet 5 mg (has no administration in time  range)     And   bisacodyl (DULCOLAX) suppository 10 mg (has no administration in time range)   lactated ringers bolus 1,000 mL (0 mL Intravenous Stopped 9/13/23 0913)   technetium tetrofosmin (Tc-MYOVIEW) injection 1 dose (1 dose Intravenous Given 9/13/23 0754)   regadenoson (LEXISCAN) injection 0.4 mg (0.4 mg Intravenous Given 9/13/23 0903)   technetium tetrofosmin (Tc-MYOVIEW) injection 1 dose (1 dose Intravenous Given 9/13/23 0903)       Imaging results:  XR Chest 1 View    Result Date: 9/13/2023  Impression: No active disease Electronically Signed: Ludwig Floyd MD  9/13/2023 6:23 AM EDT  Workstation ID: [id number]     Social issues:   Social History     Socioeconomic History    Marital status:    Tobacco Use    Smoking status: Former    Smokeless tobacco: Never   Vaping Use    Vaping Use: Never used   Substance and Sexual Activity    Alcohol use: Not Currently    Drug use: Never    Sexual activity: Defer       NIH Stroke Scale:  Interval: (not recorded)  1a. Level of Consciousness: (not recorded)  1b. LOC Questions: (not recorded)  1c. LOC Commands: (not recorded)  2. Best Gaze: (not recorded)  3. Visual: (not recorded)  4. Facial Palsy: (not recorded)  5a. Motor Arm, Left: (not recorded)  5b. Motor Arm, Right: (not recorded)  6a. Motor Leg, Left: (not recorded)  6b. Motor Leg, Right: (not recorded)  7. Limb Ataxia: (not recorded)  8. Sensory: (not recorded)  9. Best Language: (not recorded)  10. Dysarthria: (not recorded)  11. Extinction and Inattention (formerly Neglect): (not recorded)    Total (NIH Stroke Scale): (not recorded)     Additional notable assessment information:       Nursing report ED to floor:        Sharda Madden RN   09/13/23 12:53 EDT

## 2023-09-13 NOTE — PLAN OF CARE
Goal Outcome Evaluation:    New admit. Myoview done and resulted. Awaiting echo.

## 2023-09-13 NOTE — H&P
[facility] H&P    Patient Name: Renzo Thompson  : 1965  MRN: [id number]  Primary Care Physician: Rosy Love APRN  Date of admission: 2023     Patient Care Team:  Rosy Love APRN as PCP - General (Nurse Practitioner)  Cathy Prater MD as Consulting Physician (Cardiology)          Subjective   History Present Illness     Chief Complaint:   Chief Complaint   Patient presents with    Shortness of Breath     Dizziness, soa, fatigue    Shortness of Breath  Associated symptoms include chest pain and headaches.     58-year-old male who complains of several day history of fatigue feeling short of breath no energy dizzy fuzzy in his head nausea upset stomach elevated blood pressure. Patient went to another ER a few days ago and he had labs which were unremarkable and he was discharged home he denies any injury no severe headache feels some pressure there. No loss of vision no neck pain no vomiting no rashes no tick bites and no chills. Nothing seems to make it better or worse ongoing for several days.No double vision or visual changes associated with it.     Observation 23  Pt concurs with er hpi. Labs are unremarkable. Stress test was ordered. CT head ordered. Echo ordered      Review of Systems   Constitutional: Positive for malaise/fatigue.   Cardiovascular:  Positive for chest pain.   Respiratory:  Positive for shortness of breath.    Neurological:  Positive for dizziness and headaches.           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  Otherwise pertinent FHx was reviewed and unremarkable.     Social History:  reports that he has quit smoking. He has never used smokeless tobacco. He reports that he does not currently use alcohol. He reports that he does not use drugs.      Medications:  Prior to Admission medications    Medication Sig Start Date End Date Taking? Authorizing Provider   acetaminophen (TYLENOL) 325 MG tablet Take 2 tablets by mouth Every 6 (Six) Hours As Needed for Mild Pain.   Yes Beto Loyola MD   amLODIPine (NORVASC) 2.5 MG tablet Take 1 tablet by mouth Daily. 12/12/22  Yes Cathy Prater MD   Aspirin 81 MG capsule Take  by mouth Daily.   Yes eBto Loyola MD   atorvastatin (LIPITOR) 20 MG tablet Take 1 tablet by mouth Daily. 4/3/23  Yes Cathy Prater MD   Cholecalciferol (Vitamin D3) 10 MCG (400 UNIT) capsule Take  by mouth Daily.   Yes Beto Loyola MD   metoprolol succinate XL (TOPROL-XL) 25 MG 24 hr tablet Take 1 tablet by mouth Daily.   Yes Provider, Historical, MD   Saw Palmetto, Serenoa repens, (SAW PALMETTO EXTRACT PO) Take 450 mg by mouth Daily.   Yes Beto Loyola MD   Triamcinolone Acetonide (NASACORT) 55 MCG/ACT nasal inhaler 2 sprays into the nostril(s) as directed by provider Daily.   Yes Beto Loyola MD   vardenafil (LEVITRA) 20 MG tablet Take 1 tablet by mouth As Needed. 3/21/22  Yes Beto Loyola MD   esomeprazole (nexIUM) 20 MG capsule Take 20 mg by mouth Every Morning Before Breakfast.  9/13/23  Beto Loyola MD   Fish Oil-Cholecalciferol (OMEGA-3 & VITAMIN D3 GUMMIES PO) Take  by mouth.  9/13/23  Beto Loyola MD   gabapentin (NEURONTIN) 100 MG capsule Take 100 mg by mouth Daily. 12/21/20 9/13/23  Beto Loyola MD   meclizine (ANTIVERT) 25 MG tablet Take 1 tablet by mouth 3 (Three) Times a Day As Needed for Dizziness. 1/26/23 9/13/23  Toshia Geller PA   metoprolol succinate XL (TOPROL-XL) 25 MG 24 hr tablet TAKE 1 TABLET BY  MOUTH EVERY DAY 3/6/23 9/13/23  Cathy Prater MD   naproxen (NAPROSYN) 250 MG tablet Take 1 tablet by mouth 2 (Two) Times a Day With Meals. 2/6/23 9/13/23  Imani Connelly APRN   ondansetron ODT (ZOFRAN-ODT) 4 MG disintegrating tablet As Needed. 1/13/21 9/13/23  Provider, MD Beto       Allergies:    Allergies   Allergen Reactions    Penicillins Unknown - Low Severity       Objective   Objective     Vital Signs  Temp:  [98.1 °F (36.7 °C)] 98.1 °F (36.7 °C)  Heart Rate:  [54-85] 63  Resp:  [10-20] 10  BP: (131-154)/(74-95) 154/93  SpO2:  [95 %-100 %] 96 %  on   ;   Device (Oxygen Therapy): room air  Body mass index is 31.06 kg/m².    Physical Exam  Constitutional:       Appearance: Normal appearance.   HENT:      Mouth/Throat:      Mouth: Mucous membranes are moist.   Cardiovascular:      Rate and Rhythm: Normal rate and regular rhythm.      Pulses: Normal pulses.      Heart sounds: Normal heart sounds.   Pulmonary:      Effort: Pulmonary effort is normal.      Breath sounds: Normal breath sounds.   Skin:     General: Skin is warm and dry.   Neurological:      General: No focal deficit present.      Mental Status: He is alert and oriented to person, place, and time.   Psychiatric:         Mood and Affect: Mood normal.         Behavior: Behavior normal.     Results Review:  I have personally reviewed most recent cardiac tracings, lab results, and radiology images and interpretations and agree with findings, most notably: cbc, cmp, lipase, ck, sed rate, ddimer, trop, tsh, EKG, ct head, chest xray.    Results from last 7 days   Lab Units 09/13/23  0604   WBC 10*3/mm3 11.30*   HEMOGLOBIN g/dL 15.8   HEMATOCRIT % 45.6   PLATELETS 10*3/mm3 227     Results from last 7 days   Lab Units 09/13/23  1158 09/13/23  0756 09/13/23  0604   SODIUM mmol/L  --   --  139   POTASSIUM mmol/L  --   --  4.3   CHLORIDE mmol/L  --   --  104   CO2 mmol/L  --   --  24.0   BUN mg/dL  --   --  12   CREATININE mg/dL  --   --   1.01   GLUCOSE mg/dL  --   --  112*   CALCIUM mg/dL  --   --  9.7   ALT (SGPT) U/L  --   --  16   AST (SGOT) U/L  --   --  22   HSTROP T ng/L 8 8 9     Estimated Creatinine Clearance: 98.9 mL/min (by C-G formula based on SCr of 1.01 mg/dL).  Brief Urine Lab Results  (Last result in the past 365 days)        Color   Clarity   Blood   Leuk Est   Nitrite   Protein   CREAT   Urine HCG        09/13/23 0614 Yellow   Clear   Negative   Trace   Negative   Negative                   Microbiology Results (last 10 days)       ** No results found for the last 240 hours. **            ECG/EMG Results (most recent)       Procedure Component Value Units Date/Time    ECG 12 Lead Dyspnea [[id number]] Collected: 09/13/23 0555     Updated: 09/13/23 0556     QT Interval 407 ms      QTC Interval 422 ms     Narrative:      HEART RATE= 65  bpm  RR Interval= 928  ms  KY Interval= 165  ms  P Horizontal Axis= 13  deg  P Front Axis= 69  deg  QRSD Interval= 92  ms  QT Interval= 407  ms  QTcB= 422  ms  QRS Axis= 47  deg  T Wave Axis= 13  deg  - NORMAL ECG -  Sinus rhythm  When compared with ECG of 26-Jan-2023 14:48:24,  No significant change  Electronically Signed By:   Date and Time of Study: 2023-09-13 05:55:29                    XR Chest 1 View    Result Date: 9/13/2023  Impression: No active disease Electronically Signed: Ludwig Floyd MD  9/13/2023 6:23 AM EDT  Workstation ID: [id number]       Estimated Creatinine Clearance: 98.9 mL/min (by C-G formula based on SCr of 1.01 mg/dL).    Assessment & Plan   Assessment/Plan       Active Hospital Problems    Diagnosis  POA    **Shortness of breath [R06.02]  Yes      Resolved Hospital Problems   No resolved problems to display.     Dizziness  - cbc, cmp, tsh, lipase, ck, ua, sed rate, ddimer, trop and lipid panel are all unremarkable  - chest xray reviewed and showing no acute cardiopulmonary disease process  - CT head pending  - EKG rate 65, sinus no change from previous EKG, no st elevation  -  stress test performed and showing low risk for ischemia  - echo pending  - iv fluids given in er     Hypertension  -moderately Controlled       BP Readings from Last 1 Encounters:   09/13/23 154/93      - Continue metoprolol, norvasc  - Monitor while admitted         HPL  - cont home statin therapy               VTE Prophylaxis -   Mechanical Order History:        Ordered        09/13/23 1335  Place Sequential Compression Device  Once            09/13/23 1335  Maintain Sequential Compression Device  Continuous                          Pharmalogical Order History:        Ordered     Dose Route Frequency Stop    09/13/23 0741  Enoxaparin Sodium (LOVENOX) syringe 40 mg         40 mg SC Every 24 Hours --                    CODE STATUS:    Code Status and Medical Interventions:   Ordered at: 09/13/23 1336     Code Status (Patient has no pulse and is not breathing):    CPR (Attempt to Resuscitate)     Medical Interventions (Patient has pulse or is breathing):    Full Support       This patient has been examined wearing personal protective equipment.     I discussed the patient's findings and my recommendations with patient and nursing staff.      Signature: Electronically signed by Bhumika Ramirez PA-C, 09/13/23, 3:00 PM EDT.

## 2023-09-13 NOTE — ED PROVIDER NOTES
Subjective   History of Present Illness  Chief complaint fatigue short of breath dizzy    History of present illness this is a 58-year-old male who complains of several day history of fatigue feeling short of breath no energy dizzy fuzzy in his head nausea upset stomach elevated blood pressure.  No specific chest pain neck arm jaw pain no syncope or passing out no visual changes speech difficulty or paralysis.  Patient is able to eat drink talk walk and function normally otherwise although he has had decreased appetite over the last several days.  No one at home with similar illness no foreign travels no recent antibiotic use or hospitalizations.  Patient went to another ER a few days ago and he had labs which were unremarkable and he was discharged home he denies any injury no severe headache feels some pressure there.  No loss of vision no neck pain no vomiting no rashes no tick bites and no chills.  Nothing seems to make it better or worse ongoing for several days.  The dizziness is more of a lightheadedness no spinning sensation or falling sensation.  No double vision or visual changes associated with it.    Review of Systems   Constitutional:  Positive for appetite change and fatigue. Negative for chills, diaphoresis, fever and unexpected weight change.   Respiratory:  Positive for shortness of breath. Negative for chest tightness.    Cardiovascular:  Negative for chest pain and palpitations.   Gastrointestinal:  Positive for abdominal pain and nausea.   Endocrine: Negative for cold intolerance and heat intolerance.   Genitourinary:  Negative for difficulty urinating and dysuria.   Musculoskeletal:  Negative for back pain and neck pain.   Skin:  Negative for rash.   Neurological:  Positive for weakness and light-headedness. Negative for dizziness, facial asymmetry, speech difficulty and headaches.   Psychiatric/Behavioral:  Negative for confusion.      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 Authorizing Provider   amLODIPine (NORVASC) 2.5 MG tablet Take 1 tablet by mouth Daily. 12/12/22   Cathy Prater MD   Aspirin 81 MG capsule Take  by mouth Daily.    ProviderBeto MD   atorvastatin (LIPITOR) 20 MG tablet Take 1 tablet by mouth Daily. 4/3/23   Cathy Prater MD   Cholecalciferol (Vitamin D3) 10 MCG (400 UNIT) capsule Take  by mouth Daily.    Beto Loyola MD   esomeprazole (nexIUM) 20 MG capsule Take 20 mg by mouth Every Morning Before Breakfast.    Beto Loyola MD   Fish Oil-Cholecalciferol (OMEGA-3 & VITAMIN D3 GUMMIES PO) Take  by mouth.    Beto Loyola MD   gabapentin (NEURONTIN) 100 MG capsule Take 100 mg by mouth Daily. 12/21/20   Beto Loyola MD   meclizine (ANTIVERT) 25 MG tablet Take 1 tablet by mouth 3 (Three) Times a Day As Needed for Dizziness. 1/26/23   Toshia Gellre PA   metoprolol succinate XL (TOPROL-XL) 25 MG 24 hr tablet TAKE 1 TABLET BY MOUTH EVERY DAY 3/6/23   Cathy Prater MD   naproxen (NAPROSYN) 250 MG tablet Take 1 tablet by mouth 2 (Two) Times a Day With Meals. 2/6/23   Imani Connelly APRN   ondansetron ODT (ZOFRAN-ODT) 4 MG  disintegrating tablet As Needed. 1/13/21   ProviderBeto MD Saw Palmetto, Serenoa repens, (SAW PALMETTO EXTRACT PO) Take 450 mg by mouth Daily.    ProviderBeto MD   vardenafil (LEVITRA) 20 MG tablet Take 20 mg by mouth As Needed. 3/21/22   Beto Loyola MD          Objective   Physical Exam  Constitutional is a 58-year-old awake alert no acute distress triage vital signs reviewed.  HEENT extraocular muscles are intact pupils equal round react there is no photophobia no nystagmus no papilledema TMs are clear mouth is clear neck supple no adenopathy no JVD no bruits no thyroid nodules no meningeal signs.  Lungs clear no retraction no use of accessories heart regular without murmur abdomen soft nontender good bowel sounds no peritoneal findings or pulsatile masses extremities pulses equal upper and lower extremities no edema cords or Homans' sign no evidence of DVT.  Skin warm and dry without rashes or cellulitic changes neurologic awake alert orientated x3 no facial asymmetry speech normal tongue soft palate normal no nystagmus.  No drift the arms or legs normal finger-to-nose toes downgoing no clonus no focal weakness.  Procedures           ED Course      Results for orders placed or performed during the hospital encounter of 09/13/23   Comprehensive Metabolic Panel    Specimen: Blood   Result Value Ref Range    Glucose 112 (H) 65 - 99 mg/dL    BUN 12 6 - 20 mg/dL    Creatinine 1.01 0.76 - 1.27 mg/dL    Sodium 139 136 - 145 mmol/L    Potassium 4.3 3.5 - 5.2 mmol/L    Chloride 104 98 - 107 mmol/L    CO2 24.0 22.0 - 29.0 mmol/L    Calcium 9.7 8.6 - 10.5 mg/dL    Total Protein 6.5 6.0 - 8.5 g/dL    Albumin 4.5 3.5 - 5.2 g/dL    ALT (SGPT) 16 1 - 41 U/L    AST (SGOT) 22 1 - 40 U/L    Alkaline Phosphatase 69 39 - 117 U/L    Total Bilirubin 1.8 (H) 0.0 - 1.2 mg/dL    Globulin 2.0 gm/dL    A/G Ratio 2.3 g/dL    BUN/Creatinine Ratio 11.9 7.0 - 25.0    Anion Gap 11.0 5.0 - 15.0 mmol/L    eGFR 87.3  >60.0 mL/min/1.73   Lipase    Specimen: Blood   Result Value Ref Range    Lipase 46 13 - 60 U/L   Urinalysis With Microscopic If Indicated (No Culture) - Urine, Clean Catch    Specimen: Urine, Clean Catch   Result Value Ref Range    Color, UA Yellow Yellow, Straw    Appearance, UA Clear Clear    pH, UA 7.0 5.0 - 8.0    Specific Gravity, UA 1.020 1.005 - 1.030    Glucose, UA Negative Negative    Ketones, UA 40 mg/dL (2+) (A) Negative    Bilirubin, UA Negative Negative    Blood, UA Negative Negative    Protein, UA Negative Negative    Leuk Esterase, UA Trace (A) Negative    Nitrite, UA Negative Negative    Urobilinogen, UA 1.0 E.U./dL 0.2 - 1.0 E.U./dL   Single High Sensitivity Troponin T    Specimen: Blood   Result Value Ref Range    HS Troponin T 9 <15 ng/L   D-dimer, Quantitative    Specimen: Blood   Result Value Ref Range    D-Dimer, Quantitative <0.19 0.00 - 0.56 mg/L (FEU)   Sedimentation Rate    Specimen: Blood   Result Value Ref Range    Sed Rate 1 0 - 20 mm/hr   TSH    Specimen: Blood   Result Value Ref Range    TSH 1.030 0.270 - 4.200 uIU/mL   Magnesium    Specimen: Blood   Result Value Ref Range    Magnesium 2.0 1.6 - 2.6 mg/dL   CK    Specimen: Blood   Result Value Ref Range    Creatine Kinase 197 20 - 200 U/L   CBC Auto Differential    Specimen: Blood   Result Value Ref Range    WBC 11.30 (H) 3.40 - 10.80 10*3/mm3    RBC 5.04 4.14 - 5.80 10*6/mm3    Hemoglobin 15.8 13.0 - 17.7 g/dL    Hematocrit 45.6 37.5 - 51.0 %    MCV 90.5 79.0 - 97.0 fL    MCH 31.4 26.6 - 33.0 pg    MCHC 34.7 31.5 - 35.7 g/dL    RDW 12.5 12.3 - 15.4 %    RDW-SD 40.7 37.0 - 54.0 fl    MPV 7.6 6.0 - 12.0 fL    Platelets 227 140 - 450 10*3/mm3    Neutrophil % 72.4 42.7 - 76.0 %    Lymphocyte % 20.8 19.6 - 45.3 %    Monocyte % 5.7 5.0 - 12.0 %    Eosinophil % 0.4 0.3 - 6.2 %    Basophil % 0.7 0.0 - 1.5 %    Neutrophils, Absolute 8.20 (H) 1.70 - 7.00 10*3/mm3    Lymphocytes, Absolute 2.30 0.70 - 3.10 10*3/mm3    Monocytes, Absolute 0.60  0.10 - 0.90 10*3/mm3    Eosinophils, Absolute 0.00 0.00 - 0.40 10*3/mm3    Basophils, Absolute 0.10 0.00 - 0.20 10*3/mm3    nRBC 0.1 0.0 - 0.2 /100 WBC   Urinalysis, Microscopic Only - Urine, Clean Catch    Specimen: Urine, Clean Catch   Result Value Ref Range    RBC, UA 0-2 (A) None Seen /HPF    WBC, UA 0-2 (A) None Seen /HPF    Bacteria, UA None Seen None Seen /HPF    Squamous Epithelial Cells, UA 0-2 None Seen, 0-2 /HPF    Hyaline Casts, UA None Seen None Seen /LPF    Methodology Automated Microscopy    ECG 12 Lead Dyspnea   Result Value Ref Range    QT Interval 407 ms    QTC Interval 422 ms   Gold Top - SST   Result Value Ref Range    Extra Tube Hold for add-ons.      XR Chest 1 View    Result Date: 9/13/2023  Impression: No active disease Electronically Signed: Ludwig Floyd MD  9/13/2023 6:23 AM EDT  Workstation ID: [id number]   Medications   sodium chloride 0.9 % flush 10 mL (has no administration in time range)   lactated ringers bolus 1,000 mL (1,000 mL Intravenous New Bag 9/13/23 0607)       EKG my interpretation normal sinus rhythm rate of 65 normal axis no hypertrophy QTc of 422 is normal EKG unchanged from 1/26/2023.                                     Medical Decision Making  Medical decision making.  IV established monitor placed my review normal sinus rhythm patient was given a liter lactated Ringer's EKG obtained my independent review shows normal sinus rhythm without acute changes is unchanged from 1/26/2023 no acute ST elevation or acute findings chest x-ray my independent review no pneumonia pneumothorax or acute findings.  Labs obtained independent reviewed by me comprehensive metabolic profile unremarkable and bilirubin 1.8.  Lipase was 46 patient's urine was negative troponin was negative D-dimer within normal limits sed rate TSH all normal magnesium was normal CK normal CBC unremarkable white blood count 11.3.  Patient on repeat exam was resting company no distress I do not see evidence of acute  myocardial infarction DVT pulmonary embolism air dissection acute stroke meningitis encephalitis acute intra-abdominal process.  Not complete list by any means of all possibilities that can make him feel bad.  Record reviewed shows about 2years ago he had a stress test which was unremarkable had talked about heart cath but that never happened he states because of insurance reasons.  The patient also back in January 2023 had an MRI of his brain which showed no acute issues per radiology reading.  Did not repeat any scanning of his head today I did talk to him about that and did not feel it was necessary to there is no focal findings on him here and just recent MRI not too long ago after shared medical decision making we decided to hold off on that at this time.  The patient records reviewed from [facility] they did some labs and troponins all which were unremarkable.  And he was discharged home for outpatient follow-up.  Patient has had 2 ER visits within the last few days.  I talked to him about this he will need further work-up and cardiac evaluation.  He was made aware of those findings I talked to the providert we will place in observation for stress test and echo and further work-up stable unremarkable ER course.  We discussed the case.  Repeat exam resting comfortably no distress looks well otherwise.    Problems Addressed:  Dizzy: complicated acute illness or injury  Shortness of breath: complicated acute illness or injury    Amount and/or Complexity of Data Reviewed  External Data Reviewed: radiology, ECG and notes.  Labs: ordered. Decision-making details documented in ED Course.  Radiology: ordered and independent interpretation performed. Decision-making details documented in ED Course.  ECG/medicine tests: ordered and independent interpretation performed. Decision-making details documented in ED Course.    Risk  Decision regarding hospitalization.        Final diagnoses:   Shortness of  breath   Dizzy       ED Disposition  ED Disposition       ED Disposition   Decision to Admit    Condition   --    Comment   --               No follow-up provider specified.       Medication List      No changes were made to your prescriptions during this visit.            Jimmy Crum MD  09/13/23 0784

## 2023-09-14 ENCOUNTER — APPOINTMENT (OUTPATIENT)
Dept: CARDIOLOGY | Facility: HOSPITAL | Age: 58
End: 2023-09-14
Payer: COMMERCIAL

## 2023-09-14 ENCOUNTER — APPOINTMENT (OUTPATIENT)
Dept: RESPIRATORY THERAPY | Facility: HOSPITAL | Age: 58
End: 2023-09-14
Payer: COMMERCIAL

## 2023-09-14 VITALS
RESPIRATION RATE: 15 BRPM | HEART RATE: 64 BPM | HEIGHT: 71 IN | BODY MASS INDEX: 31.57 KG/M2 | TEMPERATURE: 99.4 F | OXYGEN SATURATION: 98 % | SYSTOLIC BLOOD PRESSURE: 133 MMHG | DIASTOLIC BLOOD PRESSURE: 88 MMHG | WEIGHT: 225.53 LBS

## 2023-09-14 LAB
ANION GAP SERPL CALCULATED.3IONS-SCNC: 7 MMOL/L (ref 5–15)
BASOPHILS # BLD AUTO: 0 10*3/MM3 (ref 0–0.2)
BASOPHILS NFR BLD AUTO: 0.4 % (ref 0–1.5)
BH CV ECHO MEAS - ACS: 2.2 CM
BH CV ECHO MEAS - AI P1/2T: 1372 MSEC
BH CV ECHO MEAS - AO MAX PG: 10.5 MMHG
BH CV ECHO MEAS - AO MEAN PG: 5 MMHG
BH CV ECHO MEAS - AO ROOT DIAM: 3.2 CM
BH CV ECHO MEAS - AO V2 MAX: 162 CM/SEC
BH CV ECHO MEAS - AO V2 VTI: 31.5 CM
BH CV ECHO MEAS - AVA(I,D): 2.3 CM2
BH CV ECHO MEAS - EDV(CUBED): 91.1 ML
BH CV ECHO MEAS - EDV(MOD-SP4): 76 ML
BH CV ECHO MEAS - EF(MOD-SP4): 56.2 %
BH CV ECHO MEAS - ESV(CUBED): 15.6 ML
BH CV ECHO MEAS - ESV(MOD-SP4): 33.3 ML
BH CV ECHO MEAS - FS: 44.4 %
BH CV ECHO MEAS - IVS/LVPW: 0.78 CM
BH CV ECHO MEAS - IVSD: 0.7 CM
BH CV ECHO MEAS - LA DIMENSION: 2.9 CM
BH CV ECHO MEAS - LAT PEAK E' VEL: 9.4 CM/SEC
BH CV ECHO MEAS - LV DIASTOLIC VOL/BSA (35-75): 34.3 CM2
BH CV ECHO MEAS - LV MASS(C)D: 113.6 GRAMS
BH CV ECHO MEAS - LV MAX PG: 5.1 MMHG
BH CV ECHO MEAS - LV MEAN PG: 2 MMHG
BH CV ECHO MEAS - LV SYSTOLIC VOL/BSA (12-30): 15 CM2
BH CV ECHO MEAS - LV V1 MAX: 113 CM/SEC
BH CV ECHO MEAS - LV V1 VTI: 23.1 CM
BH CV ECHO MEAS - LVIDD: 4.5 CM
BH CV ECHO MEAS - LVIDS: 2.5 CM
BH CV ECHO MEAS - LVOT AREA: 3.1 CM2
BH CV ECHO MEAS - LVOT DIAM: 2 CM
BH CV ECHO MEAS - LVPWD: 0.9 CM
BH CV ECHO MEAS - MED PEAK E' VEL: 6.9 CM/SEC
BH CV ECHO MEAS - MR MAX PG: 38.7 MMHG
BH CV ECHO MEAS - MR MAX VEL: 311 CM/SEC
BH CV ECHO MEAS - MV A DUR: 0.13 SEC
BH CV ECHO MEAS - MV A MAX VEL: 59.2 CM/SEC
BH CV ECHO MEAS - MV DEC SLOPE: 388 CM/SEC2
BH CV ECHO MEAS - MV DEC TIME: 0.14 MSEC
BH CV ECHO MEAS - MV E MAX VEL: 51.4 CM/SEC
BH CV ECHO MEAS - MV E/A: 0.87
BH CV ECHO MEAS - MV MAX PG: 1.86 MMHG
BH CV ECHO MEAS - MV MEAN PG: 1 MMHG
BH CV ECHO MEAS - MV P1/2T: 44.6 MSEC
BH CV ECHO MEAS - MV V2 VTI: 21.7 CM
BH CV ECHO MEAS - MVA(P1/2T): 4.9 CM2
BH CV ECHO MEAS - MVA(VTI): 3.3 CM2
BH CV ECHO MEAS - PA V2 MAX: 113 CM/SEC
BH CV ECHO MEAS - PULM A REVS DUR: 0.13 SEC
BH CV ECHO MEAS - PULM A REVS VEL: 26.3 CM/SEC
BH CV ECHO MEAS - PULM DIAS VEL: 24 CM/SEC
BH CV ECHO MEAS - PULM S/D: 1.41
BH CV ECHO MEAS - PULM SYS VEL: 33.8 CM/SEC
BH CV ECHO MEAS - RV MAX PG: 2.5 MMHG
BH CV ECHO MEAS - RV V1 MAX: 79 CM/SEC
BH CV ECHO MEAS - RV V1 VTI: 15.7 CM
BH CV ECHO MEAS - SI(MOD-SP4): 19.3 ML/M2
BH CV ECHO MEAS - SV(LVOT): 72.6 ML
BH CV ECHO MEAS - SV(MOD-SP4): 42.7 ML
BH CV ECHO MEAS - TAPSE (>1.6): 1.49 CM
BH CV ECHO MEAS - TR MAX PG: 18.1 MMHG
BH CV ECHO MEAS - TR MAX VEL: 213 CM/SEC
BH CV ECHO MEASUREMENTS AVERAGE E/E' RATIO: 6.31
BH CV XLRA - RV BASE: 3.6 CM
BH CV XLRA - RV LENGTH: 6.5 CM
BH CV XLRA - RV MID: 2.5 CM
BH CV XLRA - TDI S': 7 CM/SEC
BUN SERPL-MCNC: 9 MG/DL (ref 6–20)
BUN/CREAT SERPL: 9.4 (ref 7–25)
CALCIUM SPEC-SCNC: 9 MG/DL (ref 8.6–10.5)
CHLORIDE SERPL-SCNC: 103 MMOL/L (ref 98–107)
CO2 SERPL-SCNC: 29 MMOL/L (ref 22–29)
CREAT SERPL-MCNC: 0.96 MG/DL (ref 0.76–1.27)
DEPRECATED RDW RBC AUTO: 39.8 FL (ref 37–54)
EGFRCR SERPLBLD CKD-EPI 2021: 92.8 ML/MIN/1.73
EOSINOPHIL # BLD AUTO: 0.1 10*3/MM3 (ref 0–0.4)
EOSINOPHIL NFR BLD AUTO: 1.5 % (ref 0.3–6.2)
ERYTHROCYTE [DISTWIDTH] IN BLOOD BY AUTOMATED COUNT: 12.5 % (ref 12.3–15.4)
GLUCOSE SERPL-MCNC: 102 MG/DL (ref 65–99)
HCT VFR BLD AUTO: 44.3 % (ref 37.5–51)
HGB BLD-MCNC: 15.3 G/DL (ref 13–17.7)
LYMPHOCYTES # BLD AUTO: 2.8 10*3/MM3 (ref 0.7–3.1)
LYMPHOCYTES NFR BLD AUTO: 41.4 % (ref 19.6–45.3)
MAGNESIUM SERPL-MCNC: 2.1 MG/DL (ref 1.6–2.6)
MCH RBC QN AUTO: 31.8 PG (ref 26.6–33)
MCHC RBC AUTO-ENTMCNC: 34.5 G/DL (ref 31.5–35.7)
MCV RBC AUTO: 92.1 FL (ref 79–97)
MONOCYTES # BLD AUTO: 0.5 10*3/MM3 (ref 0.1–0.9)
MONOCYTES NFR BLD AUTO: 7.4 % (ref 5–12)
NEUTROPHILS NFR BLD AUTO: 3.3 10*3/MM3 (ref 1.7–7)
NEUTROPHILS NFR BLD AUTO: 49.3 % (ref 42.7–76)
NRBC BLD AUTO-RTO: 0.1 /100 WBC (ref 0–0.2)
PLATELET # BLD AUTO: 206 10*3/MM3 (ref 140–450)
PMV BLD AUTO: 8.3 FL (ref 6–12)
POTASSIUM SERPL-SCNC: 3.5 MMOL/L (ref 3.5–5.2)
QT INTERVAL: 407 MS
QTC INTERVAL: 422 MS
RBC # BLD AUTO: 4.81 10*6/MM3 (ref 4.14–5.8)
SINUS: 3.7 CM
SODIUM SERPL-SCNC: 139 MMOL/L (ref 136–145)
STJ: 2.5 CM
WBC NRBC COR # BLD: 6.7 10*3/MM3 (ref 3.4–10.8)

## 2023-09-14 PROCEDURE — 93306 TTE W/DOPPLER COMPLETE: CPT | Performed by: INTERNAL MEDICINE

## 2023-09-14 PROCEDURE — 96372 THER/PROPH/DIAG INJ SC/IM: CPT

## 2023-09-14 PROCEDURE — 83735 ASSAY OF MAGNESIUM: CPT | Performed by: PHYSICIAN ASSISTANT

## 2023-09-14 PROCEDURE — G0378 HOSPITAL OBSERVATION PER HR: HCPCS

## 2023-09-14 PROCEDURE — 80048 BASIC METABOLIC PNL TOTAL CA: CPT | Performed by: PHYSICIAN ASSISTANT

## 2023-09-14 PROCEDURE — 85025 COMPLETE CBC W/AUTO DIFF WBC: CPT | Performed by: PHYSICIAN ASSISTANT

## 2023-09-14 PROCEDURE — 97161 PT EVAL LOW COMPLEX 20 MIN: CPT

## 2023-09-14 PROCEDURE — 25010000002 ENOXAPARIN PER 10 MG: Performed by: PHYSICIAN ASSISTANT

## 2023-09-14 PROCEDURE — 93306 TTE W/DOPPLER COMPLETE: CPT

## 2023-09-14 RX ORDER — ONDANSETRON 4 MG/1
4 TABLET, ORALLY DISINTEGRATING ORAL EVERY 8 HOURS PRN
Qty: 15 TABLET | Refills: 0 | Status: SHIPPED | OUTPATIENT
Start: 2023-09-14

## 2023-09-14 RX ORDER — PANTOPRAZOLE SODIUM 40 MG/1
40 TABLET, DELAYED RELEASE ORAL DAILY
Qty: 30 TABLET | Refills: 0 | Status: SHIPPED | OUTPATIENT
Start: 2023-09-14

## 2023-09-14 RX ORDER — ALPRAZOLAM 0.5 MG/1
0.5 TABLET ORAL EVERY 6 HOURS PRN
Qty: 6 TABLET | Refills: 0 | Status: SHIPPED | OUTPATIENT
Start: 2023-09-14 | End: 2023-09-20

## 2023-09-14 RX ORDER — HYDROXYZINE HYDROCHLORIDE 25 MG/1
25 TABLET, FILM COATED ORAL 3 TIMES DAILY PRN
Qty: 15 TABLET | Refills: 0 | Status: SHIPPED | OUTPATIENT
Start: 2023-09-14

## 2023-09-14 RX ORDER — POTASSIUM CHLORIDE 20 MEQ/1
40 TABLET, EXTENDED RELEASE ORAL EVERY 4 HOURS
Status: COMPLETED | OUTPATIENT
Start: 2023-09-14 | End: 2023-09-14

## 2023-09-14 RX ADMIN — Medication 10 ML: at 08:02

## 2023-09-14 RX ADMIN — POTASSIUM CHLORIDE 40 MEQ: 1500 TABLET, EXTENDED RELEASE ORAL at 16:30

## 2023-09-14 RX ADMIN — METOPROLOL SUCCINATE 25 MG: 25 TABLET, EXTENDED RELEASE ORAL at 08:02

## 2023-09-14 RX ADMIN — ALPRAZOLAM 0.5 MG: 0.5 TABLET ORAL at 06:23

## 2023-09-14 RX ADMIN — ENOXAPARIN SODIUM 40 MG: 40 INJECTION, SOLUTION SUBCUTANEOUS at 16:22

## 2023-09-14 RX ADMIN — ASPIRIN 81 MG: 81 TABLET, CHEWABLE ORAL at 08:02

## 2023-09-14 RX ADMIN — AMLODIPINE BESYLATE 2.5 MG: 2.5 TABLET ORAL at 08:02

## 2023-09-14 RX ADMIN — POTASSIUM CHLORIDE 40 MEQ: 1500 TABLET, EXTENDED RELEASE ORAL at 12:48

## 2023-09-14 NOTE — PLAN OF CARE
Goal Outcome Evaluation:  Plan of Care Reviewed With: patient           Outcome Evaluation: Pt is a 57 y/o M admitted to [facility] on 9/13 w/ c/o SOB. PMH including but not limited to pulmonary HTN, family history of ischemic heart disease, postural dizziness with presyncope. Pt independent with bed mobility and transfers OOB. Pt was able to ambulate 32 ft independently with no use of AD. Pt is at baseline function, Skilled therapy is not required at this time. PT will sign off. Recommend return home at D/C.      Anticipated Discharge Disposition (PT): home

## 2023-09-14 NOTE — DISCHARGE SUMMARY
[facility]    ButlerRosy, JANY    CHIEF COMPLAINT:     Chest pain    HISTORY OF PRESENT ILLNESS:    HPI    58-year-old male who complains of several day history of fatigue feeling short of breath no energy dizzy fuzzy in his head nausea upset stomach elevated blood pressure. Patient went to another ER a few days ago and he had labs which were unremarkable and he was discharged home he denies any injury no severe headache feels some pressure there. No loss of vision no neck pain no vomiting no rashes no tick bites and no chills. Nothing seems to make it better or worse ongoing for several days.No double vision or visual changes associated with it.      Observation 9/13/23  Pt concurs with er hpi. Labs are unremarkable. Stress test was ordered. CT head ordered. 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       amLODIPine (NORVASC) 2.5 MG tablet Take 1 tablet by mouth Daily. 90 tablet 3 9/12/2023    Aspirin 81 MG capsule Take  by mouth Daily.   9/12/2023    atorvastatin (LIPITOR) 20 MG tablet Take 1 tablet by mouth Daily. 90 tablet 3 9/12/2023    Cholecalciferol (Vitamin D3) 10 MCG (400 UNIT) capsule Take  by mouth Daily.        metoprolol succinate XL (TOPROL-XL) 25 MG 24 hr tablet Take 1 tablet by mouth Daily.   9/12/2023    Saw Palmetto, Serenoa repens, (SAW PALMETTO EXTRACT PO) Take 450 mg by mouth Daily.       Triamcinolone Acetonide (NASACORT) 55 MCG/ACT nasal inhaler 2 sprays into the nostril(s) as directed by provider Daily.       vardenafil (LEVITRA) 20 MG tablet Take 1 tablet by mouth As Needed.        Allergies:  Penicillins      There is no immunization history on file for this patient.        REVIEW OF SYSTEMS:    ROS    Constitutional: Positive for malaise/fatigue.   Cardiovascular:  Positive for chest pain.   Respiratory:  Positive for shortness of breath.    Neurological:  Positive for dizziness and headaches.        Vital Signs  Temp:  [97.8 °F (36.6 °C)-99.4 °F (37.4 °C)] 99.4 °F (37.4 °C)  Heart Rate:  [53-64] 64  Resp:  [13-16] 15  BP: (130-154)/(83-95) 133/88          Physical Exam:  Physical Exam    Constitutional:       Appearance: Normal appearance.   HENT:      Mouth/Throat:      Mouth: Mucous membranes are moist.   Cardiovascular:      Rate and Rhythm: Normal rate and regular rhythm.      Pulses: Normal pulses.      Heart sounds: Normal heart sounds.   Pulmonary:      Effort: Pulmonary effort is normal.      Breath sounds: Normal breath sounds.   Skin:     General: Skin is warm and dry.   Neurological:      General: No focal deficit present.      Mental Status: He is alert and oriented to person, place, and time.   Psychiatric:         Mood and Affect: Mood normal.         Behavior: Behavior normal.     Emotional Behavior:    wnl   Debilities:   None    Results Review:    I reviewed the patient's new clinical results.  Lab Results (most recent)       Procedure Component Value Units Date/Time    Magnesium [[id number]]  (Normal) Collected: 09/14/23 0425    Specimen: Blood Updated: 09/14/23 0619     Magnesium 2.1 mg/dL     Basic Metabolic Panel [[id number]]  (Abnormal) Collected: 09/14/23 0425    Specimen: Blood  Updated: 09/14/23 0619     Glucose 102 mg/dL      BUN 9 mg/dL      Creatinine 0.96 mg/dL      Sodium 139 mmol/L      Potassium 3.5 mmol/L      Chloride 103 mmol/L      CO2 29.0 mmol/L      Calcium 9.0 mg/dL      BUN/Creatinine Ratio 9.4     Anion Gap 7.0 mmol/L      eGFR 92.8 mL/min/1.73     Narrative:      GFR Normal >60  Chronic Kidney Disease <60  Kidney Failure <15      CBC & Differential [[id number]]  (Normal) Collected: 09/14/23 0425    Specimen: Blood Updated: 09/14/23 0558    Narrative:      The following orders were created for panel order CBC & Differential.  Procedure                               Abnormality         Status                     ---------                               -----------         ------                     CBC Auto Differential[[id number]]        Normal              Final result                 Please view results for these tests on the individual orders.    CBC Auto Differential [[id number]]  (Normal) Collected: 09/14/23 0425    Specimen: Blood Updated: 09/14/23 0558     WBC 6.70 10*3/mm3      RBC 4.81 10*6/mm3      Hemoglobin 15.3 g/dL      Hematocrit 44.3 %      MCV 92.1 fL      MCH 31.8 pg      MCHC 34.5 g/dL      RDW 12.5 %      RDW-SD 39.8 fl      MPV 8.3 fL      Platelets 206 10*3/mm3      Neutrophil % 49.3 %      Lymphocyte % 41.4 %      Monocyte % 7.4 %      Eosinophil % 1.5 %      Basophil % 0.4 %      Neutrophils, Absolute 3.30 10*3/mm3      Lymphocytes, Absolute 2.80 10*3/mm3      Monocytes, Absolute 0.50 10*3/mm3      Eosinophils, Absolute 0.10 10*3/mm3      Basophils, Absolute 0.00 10*3/mm3      nRBC 0.1 /100 WBC     POC Glucose Once [[id number]]  (Normal) Collected: 09/13/23 1555    Specimen: Blood Updated: 09/13/23 1556     Glucose 88 mg/dL      Comment: Serial Number: [id number]:  [id number]       High Sensitivity Troponin T 2Hr [[id number]]  (Normal) Collected: 09/13/23 1158    Specimen: Blood Updated: 09/13/23 1259     HS Troponin T 8 ng/L      Troponin T Delta 0  ng/L     Narrative:      High Sensitive Troponin T Reference Range:  <10.0 ng/L- Negative Female for AMI  <15.0 ng/L- Negative Male for AMI  >=10 - Abnormal Female indicating possible myocardial injury.  >=15 - Abnormal Male indicating possible myocardial injury.   Clinicians would have to utilize clinical acumen, EKG, Troponin, and serial changes to determine if it is an Acute Myocardial Infarction or myocardial injury due to an underlying chronic condition.         Lipid Panel [[id number]]  (Abnormal) Collected: 09/13/23 0604    Specimen: Blood Updated: 09/13/23 1120     Total Cholesterol 93 mg/dL      Triglycerides 51 mg/dL      HDL Cholesterol 36 mg/dL      LDL Cholesterol  44 mg/dL      VLDL Cholesterol 13 mg/dL      LDL/HDL Ratio 1.30    Narrative:      Cholesterol Reference Ranges  (U.S. Department of Health and Human Services ATP III Classifications)    Desirable          <200 mg/dL  Borderline High    200-239 mg/dL  High Risk          >240 mg/dL      Triglyceride Reference Ranges  (U.S. Department of Health and Human Services ATP III Classifications)    Normal           <150 mg/dL  Borderline High  150-199 mg/dL  High             200-499 mg/dL  Very High        >500 mg/dL    HDL Reference Ranges  (U.S. Department of Health and Human Services ATP III Classifications)    Low     <40 mg/dl (major risk factor for CHD)  High    >60 mg/dl ('negative' risk factor for CHD)        LDL Reference Ranges  (U.S. Department of Health and Human Services ATP III Classifications)    Optimal          <100 mg/dL  Near Optimal     100-129 mg/dL  Borderline High  130-159 mg/dL  High             160-189 mg/dL  Very High        >189 mg/dL    High Sensitivity Troponin T [[id number]]  (Normal) Collected: 09/13/23 0756    Specimen: Blood Updated: 09/13/23 0837     HS Troponin T 8 ng/L     Narrative:      High Sensitive Troponin T Reference Range:  <10.0 ng/L- Negative Female for AMI  <15.0 ng/L- Negative Male for AMI  >=10 - Abnormal  "Female indicating possible myocardial injury.  >=15 - Abnormal Male indicating possible myocardial injury.   Clinicians would have to utilize clinical acumen, EKG, Troponin, and serial changes to determine if it is an Acute Myocardial Infarction or myocardial injury due to an underlying chronic condition.         Extra Tubes [[id number]] Collected: 09/13/23 0604    Specimen: Blood, Venous Line Updated: 09/13/23 0715    Narrative:      The following orders were created for panel order Extra Tubes.  Procedure                               Abnormality         Status                     ---------                               -----------         ------                     Gold Top - SST[[id number]]                                   Final result                 Please view results for these tests on the individual orders.    Gold Top - SST [[id number]] Collected: 09/13/23 0604    Specimen: Blood Updated: 09/13/23 0715     Extra Tube Hold for add-ons.     Comment: Auto resulted.       D-dimer, Quantitative [[id number]]  (Normal) Collected: 09/13/23 0604    Specimen: Blood Updated: 09/13/23 0646     D-Dimer, Quantitative <0.19 mg/L (FEU)     Narrative:      According to the assay 's published package insert, a normal (<0.50 mg/L (FEU)) D-dimer result in conjunction with a non-high clinical probability assessment, excludes deep vein thrombosis (DVT) and pulmonary embolism (PE) with high sensitivity.    D-dimer values increase with age and this can make VTE exclusion of an older population difficult. To address this, the [employer], based on best available evidence and recent guidelines, recommends that clinicians use age-adjusted D-dimer thresholds in patients greater than 50 years of age with: a) a low probability of PE who do not meet all Pulmonary Embolism Rule Out Criteria, or b) in those with intermediate probability of PE.   The formula for an age-adjusted D-dimer cut-off is \"age/100\".  For " example, a 60 year old patient would have an age-adjusted cut-off of 0.60 mg/L (FEU) and an 80 year old 0.80 mg/L (FEU).    Single High Sensitivity Troponin T [[id number]]  (Normal) Collected: 09/13/23 0604    Specimen: Blood Updated: 09/13/23 0636     HS Troponin T 9 ng/L     Narrative:      High Sensitive Troponin T Reference Range:  <10.0 ng/L- Negative Female for AMI  <15.0 ng/L- Negative Male for AMI  >=10 - Abnormal Female indicating possible myocardial injury.  >=15 - Abnormal Male indicating possible myocardial injury.   Clinicians would have to utilize clinical acumen, EKG, Troponin, and serial changes to determine if it is an Acute Myocardial Infarction or myocardial injury due to an underlying chronic condition.         TSH [[id number]]  (Normal) Collected: 09/13/23 0604    Specimen: Blood Updated: 09/13/23 0636     TSH 1.030 uIU/mL     Comprehensive Metabolic Panel [[id number]]  (Abnormal) Collected: 09/13/23 0604    Specimen: Blood Updated: 09/13/23 0633     Glucose 112 mg/dL      BUN 12 mg/dL      Creatinine 1.01 mg/dL      Sodium 139 mmol/L      Potassium 4.3 mmol/L      Chloride 104 mmol/L      CO2 24.0 mmol/L      Calcium 9.7 mg/dL      Total Protein 6.5 g/dL      Albumin 4.5 g/dL      ALT (SGPT) 16 U/L      AST (SGOT) 22 U/L      Alkaline Phosphatase 69 U/L      Total Bilirubin 1.8 mg/dL      Globulin 2.0 gm/dL      A/G Ratio 2.3 g/dL      BUN/Creatinine Ratio 11.9     Anion Gap 11.0 mmol/L      eGFR 87.3 mL/min/1.73     Narrative:      GFR Normal >60  Chronic Kidney Disease <60  Kidney Failure <15      Magnesium [[id number]]  (Normal) Collected: 09/13/23 0604    Specimen: Blood Updated: 09/13/23 0633     Magnesium 2.0 mg/dL     Lipase [[id number]]  (Normal) Collected: 09/13/23 0604    Specimen: Blood Updated: 09/13/23 0633     Lipase 46 U/L     CK [[id number]]  (Normal) Collected: 09/13/23 0604    Specimen: Blood Updated: 09/13/23 0633     Creatine Kinase 197 U/L     Urinalysis With Microscopic If  Indicated (No Culture) - Urine, Clean Catch [[id number]]  (Abnormal) Collected: 09/13/23 0614    Specimen: Urine, Clean Catch Updated: 09/13/23 0630     Color, UA Yellow     Appearance, UA Clear     pH, UA 7.0     Specific Gravity, UA 1.020     Glucose, UA Negative     Ketones, UA 40 mg/dL (2+)     Bilirubin, UA Negative     Blood, UA Negative     Protein, UA Negative     Leuk Esterase, UA Trace     Nitrite, UA Negative     Urobilinogen, UA 1.0 E.U./dL    Urinalysis, Microscopic Only - Urine, Clean Catch [[id number]]  (Abnormal) Collected: 09/13/23 0614    Specimen: Urine, Clean Catch Updated: 09/13/23 0630     RBC, UA 0-2 /HPF      WBC, UA 0-2 /HPF      Bacteria, UA None Seen /HPF      Squamous Epithelial Cells, UA 0-2 /HPF      Hyaline Casts, UA None Seen /LPF      Methodology Automated Microscopy    Sedimentation Rate [[id number]]  (Normal) Collected: 09/13/23 0604    Specimen: Blood Updated: 09/13/23 0629     Sed Rate 1 mm/hr     CBC & Differential [[id number]]  (Abnormal) Collected: 09/13/23 0604    Specimen: Blood Updated: 09/13/23 0610    Narrative:      The following orders were created for panel order CBC & Differential.  Procedure                               Abnormality         Status                     ---------                               -----------         ------                     CBC Auto Differential[[id number]]        Abnormal            Final result                 Please view results for these tests on the individual orders.    CBC Auto Differential [[id number]]  (Abnormal) Collected: 09/13/23 0604    Specimen: Blood Updated: 09/13/23 0610     WBC 11.30 10*3/mm3      RBC 5.04 10*6/mm3      Hemoglobin 15.8 g/dL      Hematocrit 45.6 %      MCV 90.5 fL      MCH 31.4 pg      MCHC 34.7 g/dL      RDW 12.5 %      RDW-SD 40.7 fl      MPV 7.6 fL      Platelets 227 10*3/mm3      Neutrophil % 72.4 %      Lymphocyte % 20.8 %      Monocyte % 5.7 %      Eosinophil % 0.4 %      Basophil % 0.7 %       Neutrophils, Absolute 8.20 10*3/mm3      Lymphocytes, Absolute 2.30 10*3/mm3      Monocytes, Absolute 0.60 10*3/mm3      Eosinophils, Absolute 0.00 10*3/mm3      Basophils, Absolute 0.10 10*3/mm3      nRBC 0.1 /100 WBC             Imaging Results (Most Recent)       Procedure Component Value Units Date/Time    CT Head Without Contrast [[id number]] Collected: 09/13/23 1618     Updated: 09/13/23 1623    Narrative:      CT HEAD WO CONTRAST    Date of Exam: 9/13/2023 4:16 PM EDT    Indication: Syncope/presyncope, cerebrovascular cause suspected.    Comparison: MRI brain without and with contrast 3/2/2023, CT head without contrast 1/26/2023    Technique: Axial CT images were obtained of the head without contrast administration.  Coronal reconstructions were performed.  Automated exposure control and iterative reconstruction methods were used.      Findings:  No intracranial hemorrhage, mass lesion, mass effect, midline shift, or evidence of acute or evolving infarct. Normal ventricular configuration. Paranasal sinuses and mastoid air cells are clear. Calvarium is normal. Orbital structures are within normal   limits.      Impression:      Impression:  Normal noncontrast CT head.      Electronically Signed: Roya Adames MD    9/13/2023 4:21 PM EDT    Workstation ID: [id number]    XR Chest 1 View [[id number]] Collected: 09/13/23 0623     Updated: 09/13/23 0626    Narrative:      XR CHEST 1 VW    Date of Exam: 9/13/2023 6:06 AM EDT    Indication: Short of breath    Comparison: January 26, 2023    Findings:  There are no airspace consolidations. No pleural fluid. No pneumothorax. The pulmonary vasculature appears within normal limits. The cardiac and mediastinal silhouette appear unremarkable. No acute osseous abnormality identified.      Impression:      Impression:  No active disease      Electronically Signed: Ludwig Floyd MD    9/13/2023 6:23 AM EDT    Workstation ID: [id number]          reviewed    ECG/EMG Results (most  recent)       Procedure Component Value Units Date/Time    SCANNED - TELEMETRY   [[id number]] Resulted: 09/13/23     Updated: 09/13/23 1622    SCANNED - TELEMETRY   [[id number]] Resulted: 09/13/23     Updated: 09/13/23 1942    SCANNED - TELEMETRY   [[id number]] Resulted: 09/13/23     Updated: 09/14/23 0045    SCANNED - TELEMETRY   [[id number]] Resulted: 09/13/23     Updated: 09/14/23 0507    ECG 12 Lead Dyspnea [[id number]] Collected: 09/13/23 0555     Updated: 09/14/23 0756     QT Interval 407 ms      QTC Interval 422 ms     Narrative:      HEART RATE= 65  bpm  RR Interval= 928  ms  KY Interval= 165  ms  P Horizontal Axis= 13  deg  P Front Axis= 69  deg  QRSD Interval= 92  ms  QT Interval= 407  ms  QTcB= 422  ms  QRS Axis= 47  deg  T Wave Axis= 13  deg  - NORMAL ECG -  Sinus rhythm  When compared with ECG of 26-Jan-2023 14:48:24,  No significant change  Electronically Signed By: Jimmy Crum (ADOLFO) 14-Sep-2023 07:56:24  Date and Time of Study: 2023-09-13 05:55:29    SCANNED - TELEMETRY   [[id number]] Resulted: 09/13/23     Updated: 09/14/23 0845    SCANNED - TELEMETRY   [[id number]] Resulted: 09/13/23     Updated: 09/14/23 1205    Adult Transthoracic Echo Complete W/ Cont if Necessary Per Protocol [[id number]] Resulted: 09/14/23 1513     Updated: 09/14/23 1514     LVIDd 4.5 cm      LVIDs 2.5 cm      IVSd 0.70 cm      LVPWd 0.90 cm      FS 44.4 %      IVS/LVPW 0.78 cm      LV Sys Vol (BSA corrected) 15.0 cm2      EDV(cubed) 91.1 ml      LV Ferro Vol (BSA corrected) 34.3 cm2      LVOT area 3.1 cm2      LV mass(C)d 113.6 grams      LVOT diam 2.00 cm      EDV(MOD-sp4) 76.0 ml      ESV(MOD-sp4) 33.3 ml      SV(MOD-sp4) 42.7 ml      SI(MOD-sp4) 19.3 ml/m2      EF(MOD-sp4) 56.2 %      MV E max jesus 51.4 cm/sec      MV A max jesus 59.2 cm/sec      MV dec time 0.14 msec      MV E/A 0.87     Pulm A Revs Dur 0.13 sec      MV A dur 0.13 sec      Med Peak E' Jesus 6.9 cm/sec      Lat Peak E' Jesus 9.4 cm/sec      Avg E/e' ratio 6.31      SV(LVOT) 72.6 ml      RV Base 3.6 cm      RV Mid 2.5 cm      RV Length 6.5 cm      TAPSE (>1.6) 1.49 cm      RV S' 7.0 cm/sec      LA dimension (2D)  2.9 cm      Pulm Sys Jesus 33.8 cm/sec      Pulm Ferro Jesus 24.0 cm/sec      Pulm S/D 1.41     Pulm A Revs Jesus 26.3 cm/sec      LV V1 max 113.0 cm/sec      LV V1 max PG 5.1 mmHg      LV V1 mean PG 2.00 mmHg      LV V1 VTI 23.1 cm      Ao pk jesus 162.0 cm/sec      Ao max PG 10.5 mmHg      Ao mean PG 5.0 mmHg      Ao V2 VTI 31.5 cm      TANVI(I,D) 2.30 cm2      AI P1/2t 1,372 msec      MV max PG 1.86 mmHg      MV mean PG 1.00 mmHg      MV V2 VTI 21.7 cm      MV P1/2t 44.6 msec      MVA(P1/2t) 4.9 cm2      MVA(VTI) 3.3 cm2      MV dec slope 388.0 cm/sec2      MR max jesus 311.0 cm/sec      MR max PG 38.7 mmHg      TR max jesus 213.0 cm/sec      TR max PG 18.1 mmHg      RV V1 max PG 2.50 mmHg      RV V1 max 79.0 cm/sec      RV V1 VTI 15.7 cm      PA V2 max 113.0 cm/sec      Ao root diam 3.2 cm      ACS 2.20 cm      Sinus 3.7 cm      STJ 2.5 cm           reviewed            Microbiology Results (last 10 days)       ** No results found for the last 240 hours. **            Assessment & Plan     Shortness of breath     Dizziness  - cbc, cmp, tsh, lipase, ck, ua, sed rate, ddimer, trop and lipid panel are all unremarkable  - chest xray reviewed and showing no acute cardiopulmonary disease process  - CT head showing no acute intra cranial process  - EKG rate 65, sinus no change from previous EKG, no st elevation  - stress test performed and showing low risk for ischemia  - echo pending  - iv fluids given in er     Hypertension  -moderately Controlled         BP Readings from Last 1 Encounters:   09/13/23 154/93      - Continue metoprolol, norvasc  - Monitor while admitted      HPL  - cont home statin therapy     Anxiety  - pt had panic attack  - xanax given    I discussed the patients findings and my recommendations with patient and nursing staff.     Discharge Diagnosis:      Shortness  of breath      Hospital Course  Patient is a 58 y.o. male presented with fatigue, chest pain, soa and dizziness. Pt has had issues with dizziness since jan 2023. Pt was recently seen in er for same and had work up and discharged home. Pt states it reoccurred and was reevaluated by er and admitted to observation. Labs including cbc, cmp, lipase, tsh, ck, UA, sed rate, ddimer, trop and lipid panel are all unremarkable. Chest xray and CT head are normal. Echo performed and is pending. Stress test performed and is low risk for ischemia. Pt had anxiety attack while inpatient and will follow up with pcp. Will place mcot for outpt. Pt to follow up with cardiology. Discharged discussed with pt and he is agreeable to plan. Instructed pt to return to er if symptoms reoccur or worsen.      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            Condition on Discharge:     Stable    Discharge Disposition      Discharge Medications     Discharge Medications        ASK your doctor about these medications        Instructions Start Date   acetaminophen 325 MG tablet  Commonly known as: TYLENOL   650 mg, Oral, Every 6 Hours PRN      amLODIPine 2.5 MG tablet  Commonly known as: NORVASC   2.5 mg, Oral, Daily      Aspirin 81 MG  capsule   Oral, Daily      atorvastatin 20 MG tablet  Commonly known as: LIPITOR   20 mg, Oral, Daily      metoprolol succinate XL 25 MG 24 hr tablet  Commonly known as: TOPROL-XL  Ask about: Which instructions should I use?   25 mg, Oral, Daily      SAW PALMETTO EXTRACT PO   450 mg, Oral, Daily      Triamcinolone Acetonide 55 MCG/ACT nasal inhaler  Commonly known as: NASACORT   2 sprays, Nasal, Daily      vardenafil 20 MG tablet  Commonly known as: LEVITRA   20 mg, Oral, As Needed      Vitamin D3 10 MCG (400 UNIT) capsule   Oral, Daily               Discharge Diet:     Activity at Discharge:     Follow-up Appointments  No future appointments.      Test Results Pending at Discharge       Risk for Readmission (LACE) Score: 3 (9/14/2023  6:00 AM)      Greater than 30 minutes spent in discharge activities for this patient    Bhumika Ramirez PA-C  09/14/23  15:48 EDT

## 2023-09-14 NOTE — PLAN OF CARE
"Pt has no c/o of pain, but states that there is \"tightness\" in the epigastric region and head. He states it doesn't feel like a headache.  Pt says he feels nauseous while eating and hasn't ate much since Saturday. He states that he feels as though he's \"going to pop\" because of the abdominal tightness. Pt also states that when he eats he feels as though he will pass out. He states during these episodes of tightness in abdomen, epigastric region, and head he experiences blurred vision, dizziness, diaphoresis, and chills. Pt states he is having \"watery stool,\" but believes it to be because he has been eating mostly liquid foods. Pt states he use to take nexium because his NP believed that he had stomach ulcers. He states that he doesn't take nexium anymore and hasn't for a while. Pt states that he may want to see GI for further evaluation. No further orders at this time. Care continues.                      "

## 2023-09-14 NOTE — CASE MANAGEMENT/SOCIAL WORK
Discharge Planning Assessment  [facility]     Patient Name: Renzo Thompson  MRN: [id number]  Today's Date: 9/14/2023    Admit Date: 9/13/2023    Plan: Return home with spouse   Discharge Needs Assessment       Row Name 09/14/23 1326       Living Environment    People in Home spouse    Name(s) of People in Home Ria    Current Living Arrangements home    Potentially Unsafe Housing Conditions none    Primary Care Provided by self    Provides Primary Care For no one    Family Caregiver if Needed none    Quality of Family Relationships supportive    Able to Return to Prior Arrangements yes       Resource/Environmental Concerns    Transportation Concerns none       Transition Planning    Patient/Family Anticipates Transition to home with family    Patient/Family Anticipated Services at Transition none    Transportation Anticipated family or friend will provide       Discharge Needs Assessment    Readmission Within the Last 30 Days no previous admission in last 30 days    Equipment Currently Used at Home none    Concerns to be Addressed no discharge needs identified    Anticipated Changes Related to Illness none    Equipment Needed After Discharge none                   Discharge Plan       Row Name 09/14/23 7651       Plan    Plan Comments Barriers: Echo today. CM met with Mr. Thompson who lives with his wife, drives ,works and is independent. PCP and Pharmacy verified.      Row Name 09/14/23 1331       Plan    Plan Return home with spouse                  Demographic Summary       Row Name 09/14/23 1324       General Information    Admission Type observation    Arrived From emergency department    Referral Source admission list    Reason for Consult discharge planning    Preferred Language English       Contact Information    Permission Granted to Share Info With                    Functional Status       Row Name 09/14/23 1323       Functional Status    Usual Activity Tolerance good    Current Activity Tolerance  good       Functional Status, IADL    Medications independent    Meal Preparation assistive person    Housekeeping assistive person    Laundry assistive person    Shopping assistive person                  Maintained distance greater than six feet and spent less than 15 minutes in the room.     Toshia SNEED,RN Case Manager  [facility]  Phone: desk- [phone] cell- [phone]

## 2023-09-14 NOTE — THERAPY EVALUATION
"Patient Name: Renzo Thompson  : 1965    MRN: [id number]                              Today's Date: 2023       Admit Date: 2023    Visit Dx:     ICD-10-CM ICD-9-CM   1. Shortness of breath  R06.02 786.05   2. 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    HAND SURGERY      HERNIA REPAIR       &     TONSILLECTOMY        General Information       Row Name 23 1431          Physical Therapy Time and Intention    Document Type evaluation  -TH     Mode of Treatment physical therapy  -TH       Row Name 23 1431          General Information    Prior Level of Function independent:  -TH     Existing Precautions/Restrictions no known precautions/restrictions  -TH     Barriers to Rehab none identified  -TH       Row Name 23 1431          Living Environment    People in Home spouse  -TH       Row Name 23 1431          Cognition    Orientation Status (Cognition) oriented x 4  -TH       Row Name 23 1431          Safety Issues, Functional Mobility    Safety Issues Affecting Function (Mobility) ability to follow commands;safety precautions follow-through/compliance  -TH     Comment, Safety Issues/Impairments (Mobility) Pt reported \"tightness\" and \"light headedness\" with positional change and ambulation  -TH               User Key  (r) = Recorded By, (t) = Taken By, (c) = Cosigned By      Initials Name Provider Type    TH Jaimie Basurto, PT Physical Therapist                   Mobility       Row Name 23 1436          Bed-Chair Transfer    Bed-Chair [place] " (Transfers) independent  -TH       [facility] Name 09/14/23 1436          Sit-Stand Transfer    Sit-Stand [place] (Transfers) independent  -TH       [facility] Name 09/14/23 1436          Gait/Stairs (Locomotion)    [place] Level (Gait) independent  -TH     Patient was able to Ambulate yes  -TH     Distance in Feet (Gait) 32  -TH     Deviations/Abnormal Patterns (Gait) gait speed decreased  -TH               User Key  (r) = Recorded By, (t) = Taken By, (c) = Cosigned By      Initials Name Provider Type     Jaimie Basurto PT Physical Therapist                   Obj/Interventions       [facility] Name 09/14/23 1437          Range of Motion Comprehensive    General Range of Motion no range of motion deficits identified  -TH       Row Name 09/14/23 1437          Strength Comprehensive (MMT)    General Manual Muscle Testing (MMT) Assessment no strength deficits identified  -TH       Row Name 09/14/23 1437          Balance    Balance Assessment sitting static balance;sitting dynamic balance;sit to stand dynamic balance;standing static balance;standing dynamic balance  -TH     Static Sitting Balance independent  -TH     Dynamic Sitting Balance independent  -TH     Position, Sitting Balance unsupported;sitting edge of bed  -TH     Sit to Stand Dynamic Balance independent  -TH     Static Standing Balance independent  -TH     Dynamic Standing Balance independent  -TH     Position/Device Used, Standing Balance unsupported  -TH       Row Name 09/14/23 1437          Sensory Assessment (Somatosensory)    Sensory Assessment (Somatosensory) sensation intact  -TH               User Key  (r) = Recorded By, (t) = Taken By, (c) = Cosigned By      Initials Name Provider Type     Jaimie Basurto PT Physical Therapist                   Goals/Plan    No documentation.                  Clinical Impression       Row Name 09/14/23 1439          Pain    Pretreatment Pain Rating 0/10 - no pain  -TH     Posttreatment Pain Rating 0/10 - no pain  -      "Pre/Posttreatment Pain Comment Pt reported no pain but \"tightness\" on the top of his head, \"lightheadedness\" and feel \"unstable and hot\" after ambulation.  -TH       Row Name 09/14/23 1439          Plan of Care Review    Plan of Care Reviewed With patient  -TH     Outcome Evaluation Pt is a 59 y/o M admitted to [facility] on 9/13 w/ c/o SOB. PMH including but not limited to pulmonary HTN, family history of ischemic heart disease, postural dizziness with presyncope. Pt independent with bed mobility and transfers OOB. Pt was able to ambulate 32 ft independently with no use of AD. Pt is at baseline function, Skilled therapy is not required at this time. PT will sign off. Recommend return home at D/C.  -TH       Row Name 09/14/23 1439          Therapy Assessment/Plan (PT)    Patient/Family Therapy Goals Statement (PT) go home  -TH     Criteria for Skilled Interventions Met (PT) no problems identified which require skilled intervention  -TH     Therapy Frequency (PT) evaluation only  -TH       Row Name 09/14/23 1439          Vital Signs    O2 Delivery Pre Treatment room air  -TH     O2 Delivery Post Treatment room air  -TH     Pre Patient Position Supine  -TH     Intra Patient Position Sitting  -TH     Post Patient Position Sitting  -TH       Row Name 09/14/23 1439          Positioning and Restraints    Pre-Treatment Position in bed  -TH     Post Treatment Position bed  -TH     In Bed sitting;call light within reach;notified ns  -TH               User Key  (r) = Recorded By, (t) = Taken By, (c) = Cosigned By      Initials Name Provider Type     Jaimie Basurto, PT Physical Therapist                   Outcome Measures       Row Name 09/14/23 1446 09/14/23 0800       How much help from another person do you currently need...    Turning from your back to your side while in flat bed without using bedrails? 4 -TH 4  -AT    Moving from lying on back to sitting on the side of a flat bed without bedrails? 4 -TH 4  -AT    Moving to and " from a bed to a chair (including a wheelchair)? 4  -TH 4  -AT    Standing up from a chair using your arms (e.g., wheelchair, bedside chair)? 4 -TH 4  -AT    Climbing 3-5 steps with a railing? 4  -TH 4  -AT    To walk in hospital room? 4  -TH 4  -AT    AM-PAC 6 Clicks Score (PT) 24 -TH 24  -AT    Highest level of mobility 8 --> Walked 250 feet or more  -TH 8 --> Walked 250 feet or more  -AT      Row Name 09/14/23 1446          Functional Assessment    Outcome Measure Options AM-PAC 6 Clicks Basic Mobility (PT)  -TH               User Key  (r) = Recorded By, (t) = Taken By, (c) = Cosigned By      Initials Name Provider Type    AT Ricki Zapata, RN Registered Nurse     Jaimie Basurto, NANCY Physical Therapist                                 Physical Therapy Education       Title: PT OT SLP Therapies (Done)       Topic: Physical Therapy (Done)       Point: Mobility training (Done)       Learning Progress Summary             Patient Acceptance, E, VU by  at 9/14/2023 1447                         Point: Home exercise program (Done)       Learning Progress Summary             Patient Acceptance, E, VU by  at 9/14/2023 1447                         Point: Body mechanics (Done)       Learning Progress Summary             Patient Acceptance, E, VU by  at 9/14/2023 1447                         Point: Precautions (Done)       Learning Progress Summary             Patient Acceptance, E, VU by  at 9/14/2023 1447                                         User Key       Initials Effective Dates Name Provider Type Discipline     08/14/23 -  Jaimie Basurto PT Physical Therapist PT                  PT Recommendation and Plan     Plan of Care Reviewed With: patient  Outcome Evaluation: Pt is a 57 y/o M admitted to [facility] on 9/13 w/ c/o SOB. PMH including but not limited to pulmonary HTN, family history of ischemic heart disease, postural dizziness with presyncope. Pt independent with bed mobility and transfers OOB. Pt was able to  ambulate 32 ft independently with no use of AD. Pt is at baseline function, Skilled therapy is not required at this time. PT will sign off. Recommend return home at D/C.     Time Calculation:         PT Charges       Row Name 09/14/23 1447             Time Calculation    Start Time 1407  -TH      Stop Time 1421  -TH      Time Calculation (min) 14 min  -TH      PT Received On 09/14/23  -TH         Time Calculation- PT    Total Timed Code Minutes- PT 14 minute(s)  -TH                User Key  (r) = Recorded By, (t) = Taken By, (c) = Cosigned By      Initials Name Provider Type     Jaimie Basurto, NANCY Physical Therapist                  Therapy Charges for Today       Code Description Service Date Service Provider Modifiers Qty    [id number] HC PT EVAL LOW COMPLEXITY 4 9/14/2023 Jaimie Basurto, PT GP 1            PT G-Codes  Outcome Measure Options: AM-PAC 6 Clicks Basic Mobility (PT)  AM-PAC 6 Clicks Score (PT): 24  PT Discharge Summary  Anticipated Discharge Disposition (PT): home    Jaimie Basurto PT  9/14/2023

## 2023-11-27 RX ORDER — AMLODIPINE BESYLATE 2.5 MG/1
2.5 TABLET ORAL DAILY
Qty: 90 TABLET | Refills: 3 | Status: SHIPPED | OUTPATIENT
Start: 2023-11-27

## 2023-11-27 NOTE — TELEPHONE ENCOUNTER
Rx Refill Note  Requested Prescriptions     Pending Prescriptions Disp Refills    amLODIPine (NORVASC) 2.5 MG tablet [Pharmacy Med Name: AMLODIPINE BESYLATE 2.5 MG TAB] 90 tablet 3     Sig: TAKE 1 TABLET BY MOUTH EVERY DAY      Last office visit with prescribing clinician: 2/15/2023   Last telemedicine visit with prescribing clinician: Visit date not found   Next office visit with prescribing clinician: Visit date not found                             Nena Braga MA  11/27/23, 08:54 EST

## 2024-02-23 RX ORDER — METOPROLOL SUCCINATE 25 MG/1
25 TABLET, EXTENDED RELEASE ORAL DAILY
Qty: 30 TABLET | Refills: 0 | Status: SHIPPED | OUTPATIENT
Start: 2024-02-23

## 2024-02-23 NOTE — TELEPHONE ENCOUNTER
Rx Refill Note  Requested Prescriptions     Pending Prescriptions Disp Refills    metoprolol succinate XL (TOPROL-XL) 25 MG 24 hr tablet [Pharmacy Med Name: METOPROLOL SUCC ER 25 MG TAB] 30 tablet 0     Sig: TAKE 1 TABLET BY MOUTH EVERY DAY      Last office visit with prescribing clinician: 2/15/2023   Last telemedicine visit with prescribing clinician: Visit date not found   Next office visit with prescribing clinician: Visit date not found                         Would you like a call back once the refill request has been completed: [] Yes [] No    If the office needs to give you a call back, can they leave a voicemail: [] Yes [] No    Juliet Hernández MA  02/23/24, 08:34 EST

## 2024-03-18 ENCOUNTER — TELEPHONE (OUTPATIENT)
Dept: CARDIOLOGY | Facility: CLINIC | Age: 59
End: 2024-03-18
Payer: COMMERCIAL

## 2024-03-18 RX ORDER — METOPROLOL SUCCINATE 25 MG/1
25 TABLET, EXTENDED RELEASE ORAL DAILY
Qty: 90 TABLET | Refills: 3 | Status: SHIPPED | OUTPATIENT
Start: 2024-03-18

## 2024-03-18 NOTE — TELEPHONE ENCOUNTER
Rx Refill Note  Requested Prescriptions      No prescriptions requested or ordered in this encounter      Last office visit with prescribing clinician: 2/15/2023   Last telemedicine visit with prescribing clinician: Visit date not found   Next office visit with prescribing clinician: Visit date not found                           Nena Braga MA  03/18/24, 09:19 EDT

## 2024-03-22 RX ORDER — ATORVASTATIN CALCIUM 20 MG/1
20 TABLET, FILM COATED ORAL DAILY
Qty: 90 TABLET | Refills: 3 | Status: SHIPPED | OUTPATIENT
Start: 2024-03-22

## 2024-03-22 NOTE — TELEPHONE ENCOUNTER
Rx Refill Note  Requested Prescriptions     Pending Prescriptions Disp Refills    atorvastatin (LIPITOR) 20 MG tablet [Pharmacy Med Name: ATORVASTATIN 20 MG TABLET] 90 tablet 3     Sig: TAKE 1 TABLET BY MOUTH EVERY DAY      Last office visit with prescribing clinician: 2/15/2023   Last telemedicine visit with prescribing clinician: Visit date not found   Next office visit with prescribing clinician: Visit date not found                             Nena Braga MA  03/22/24, 08:31 EDT

## 2024-08-16 RX ORDER — AMLODIPINE BESYLATE 2.5 MG/1
2.5 TABLET ORAL DAILY
Qty: 15 TABLET | Refills: 0 | Status: SHIPPED | OUTPATIENT
Start: 2024-08-16

## 2024-08-16 NOTE — TELEPHONE ENCOUNTER
Rx Refill Note  Requested Prescriptions     Pending Prescriptions Disp Refills    amLODIPine (NORVASC) 2.5 MG tablet [Pharmacy Med Name: AMLODIPINE BESYLATE 2.5 MG TAB] 90 tablet 3     Sig: TAKE 1 TABLET BY MOUTH EVERY DAY      Last office visit with prescribing clinician: 2/15/2023   Last telemedicine visit with prescribing clinician: Visit date not found   Next office visit with prescribing clinician: Visit date not found                         Would you like a call back once the refill request has been completed: [] Yes [] No    If the office needs to give you a call back, can they leave a voicemail: [] Yes [] No    Juliet Hernández MA  08/16/24, 15:41 EDT

## 2024-09-03 RX ORDER — AMLODIPINE BESYLATE 2.5 MG/1
2.5 TABLET ORAL DAILY
Refills: 0 | OUTPATIENT
Start: 2024-09-03

## 2024-09-03 NOTE — TELEPHONE ENCOUNTER
Rx Refill Note  Requested Prescriptions     Pending Prescriptions Disp Refills    amLODIPine (NORVASC) 2.5 MG tablet 15 tablet 0     Sig: Take 1 tablet by mouth Daily.      Last office visit with prescribing clinician: 2/15/2023   Last telemedicine visit with prescribing clinician: Visit date not found   Next office visit with prescribing clinician: Visit date not found                         Would you like a call back once the refill request has been completed: [] Yes [] No    If the office needs to give you a call back, can they leave a voicemail: [] Yes [] No    Juliet Hernández MA  09/03/24, 14:23 EDT

## 2024-09-05 ENCOUNTER — TELEPHONE (OUTPATIENT)
Dept: CARDIOLOGY | Facility: CLINIC | Age: 59
End: 2024-09-05
Payer: COMMERCIAL

## 2024-09-05 NOTE — TELEPHONE ENCOUNTER
Patient has been contacted unfortunately the number is disconnected patient wife's number is on here and she is on his HIPAA but no answer left message to call our office back. Patient hasn't been seen in over a year we have sent in refills we have sent them in but we will not send in any more due to patient needs appt to continue. Called pharmacy as well and refused the Rx. Pharmacist stated she will place a note on his file so he can be told of this and see if he will call our office to make an appt.

## 2024-09-20 RX ORDER — AMLODIPINE BESYLATE 2.5 MG/1
2.5 TABLET ORAL DAILY
Qty: 15 TABLET | Refills: 0 | OUTPATIENT
Start: 2024-09-20

## 2024-09-23 ENCOUNTER — TELEPHONE (OUTPATIENT)
Dept: CARDIOLOGY | Facility: CLINIC | Age: 59
End: 2024-09-23
Payer: COMMERCIAL

## 2024-09-23 DIAGNOSIS — I27.20 PULMONARY HYPERTENSION: Primary | ICD-10-CM

## 2024-09-23 DIAGNOSIS — I10 ESSENTIAL HYPERTENSION: ICD-10-CM

## 2024-09-24 RX ORDER — AMLODIPINE BESYLATE 2.5 MG/1
2.5 TABLET ORAL DAILY
Qty: 30 TABLET | Refills: 1 | Status: SHIPPED | OUTPATIENT
Start: 2024-09-24

## 2024-10-21 DIAGNOSIS — I10 ESSENTIAL HYPERTENSION: ICD-10-CM

## 2024-10-21 RX ORDER — AMLODIPINE BESYLATE 2.5 MG/1
2.5 TABLET ORAL DAILY
Qty: 30 TABLET | Refills: 0 | Status: SHIPPED | OUTPATIENT
Start: 2024-10-21

## 2024-10-21 NOTE — TELEPHONE ENCOUNTER
Rx Refill Note  Requested Prescriptions     Refused Prescriptions Disp Refills    amLODIPine (NORVASC) 2.5 MG tablet 30 tablet 1     Sig: Take 1 tablet by mouth Daily.     Refused By: MARIO ALBERTO BRAGA      Last office visit with prescribing clinician: 2/15/2023   Last telemedicine visit with prescribing clinician: Visit date not found   Next office visit with prescribing clinician: Visit date not found                           Mario Alberto Braga MA  10/21/24, 14:11 EDT    Pt has an appt on 11/20/24 at [place] office with [facility].

## 2024-10-24 DIAGNOSIS — I10 ESSENTIAL HYPERTENSION: ICD-10-CM

## 2024-11-20 ENCOUNTER — OFFICE VISIT (OUTPATIENT)
Dept: CARDIOLOGY | Facility: CLINIC | Age: 59
End: 2024-11-20
Payer: COMMERCIAL

## 2024-11-20 VITALS
HEART RATE: 60 BPM | WEIGHT: 249.2 LBS | BODY MASS INDEX: 34.89 KG/M2 | HEIGHT: 71 IN | OXYGEN SATURATION: 97 % | SYSTOLIC BLOOD PRESSURE: 156 MMHG | DIASTOLIC BLOOD PRESSURE: 92 MMHG

## 2024-11-20 DIAGNOSIS — I10 ESSENTIAL HYPERTENSION: Primary | ICD-10-CM

## 2024-11-20 RX ORDER — AMLODIPINE BESYLATE 5 MG/1
5 TABLET ORAL DAILY
Qty: 90 TABLET | Refills: 3 | Status: SHIPPED | OUTPATIENT
Start: 2024-11-20

## 2024-11-20 RX ORDER — AZELASTINE HYDROCHLORIDE 137 UG/1
SPRAY, METERED NASAL
COMMUNITY
Start: 2024-11-10

## 2024-11-20 RX ORDER — CETIRIZINE HYDROCHLORIDE 10 MG/1
1 TABLET ORAL DAILY
COMMUNITY
Start: 2024-10-24

## 2024-11-20 NOTE — PROGRESS NOTES
Progress note      Name: Renzo Thompson ADMIT: (Not on file)   : 1965  PCP: Provider, No Known    MRN: [id number] LOS: 0 days   AGE/SEX: 59 y.o. male  ROOM: Room/bed info not found     Chief Complaint   Patient presents with    Follow-up     1yr        Subjective       History of present illness  Renzo Thompson is a 59 regular rate and rhythm, no murmur-year-old male patient with hypertension, dyslipidemia, family history of coronary artery disease, he is in today for follow-up.  Patient has been complaining of headaches and vertigo type symptoms and is being evaluated by neurology.  He did have some chest pain after taking naproxen but it seems to have subsided after he quit taking the medication.  Otherwise he has not been having any exertional chest pain or shortness of breath.     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    HAND SURGERY      HERNIA REPAIR       &     TONSILLECTOMY       Family History   Problem Relation Age of Onset    Heart disease Father     Hypertension Father      Social History     Tobacco Use    Smoking status: Former    Smokeless tobacco: Never   Vaping Use    Vaping status: Never Used   Substance Use Topics    Alcohol use: Not Currently    Drug use: Never       Current Outpatient Medications:     acetaminophen (TYLENOL) 325 MG tablet, Take 2 tablets by mouth Every 6 (Six) Hours As Needed for Mild Pain., Disp: , Rfl:     amLODIPine (NORVASC) 5 MG tablet, Take 1 tablet by mouth Daily., Disp: 90 tablet, Rfl: 3    Aspirin 81 MG capsule, Take  by mouth Daily., Disp: , Rfl:     atorvastatin (LIPITOR) 20 MG tablet, TAKE 1 TABLET BY MOUTH EVERY DAY, Disp: 90 tablet, Rfl: 3    Azelastine HCl 137 MCG/SPRAY solution, INSTILL 2 SPRAYS INTO EACH NOSTRIL DAILY IN THE EVENING., Disp: , Rfl:      cetirizine (zyrTEC) 10 MG tablet, Take 1 tablet by mouth Daily., Disp: , Rfl:     metoprolol succinate XL (TOPROL-XL) 25 MG 24 hr tablet, Take 1 tablet by mouth Daily., Disp: 90 tablet, Rfl: 3    Triamcinolone Acetonide (NASACORT) 55 MCG/ACT nasal inhaler, Administer 2 sprays into the nostril(s) as directed by provider Daily., Disp: , Rfl:     vardenafil (LEVITRA) 20 MG tablet, Take 1 tablet by mouth As Needed., Disp: , Rfl:     Cholecalciferol (Vitamin D3) 10 MCG (400 UNIT) capsule, Take  by mouth Daily. (Patient not taking: Reported on 11/20/2024), Disp: , Rfl:     hydrOXYzine (ATARAX) 25 MG tablet, Take 1 tablet by mouth 3 (Three) Times a Day As Needed for Itching. (Patient not taking: Reported on 11/20/2024), Disp: 15 tablet, Rfl: 0    ondansetron ODT (ZOFRAN-ODT) 4 MG disintegrating tablet, Place 1 tablet on the tongue Every 8 (Eight) Hours As Needed for Nausea or Vomiting. (Patient not taking: Reported on 11/20/2024), Disp: 15 tablet, Rfl: 0    pantoprazole (PROTONIX) 40 MG EC tablet, Take 1 tablet by mouth Daily. (Patient not taking: Reported on 11/20/2024), Disp: 30 tablet, Rfl: 0    Saw Palmetto, Serenoa repens, (SAW PALMETTO EXTRACT PO), Take 450 mg by mouth Daily. (Patient not taking: Reported on 11/20/2024), Disp: , Rfl:   Allergies:  Penicillins      Physical Exam  VITALS REVIEWED    General:      well developed, in no acute distress.    Head:      normocephalic and atraumatic.    Eyes:      PERRL/EOM intact, conjunctiva and sclera clear with out nystagmus.    Neck:      no masses, thyromegaly,  trachea central with normal respiratory effort and PMI displaced laterally  Lungs:      Clear to auscultation bilaterally  Heart:       Regular rate and rhythm, no murmur  Msk:      no deformity or scoliosis noted of thoracic or lumbar spine.    Pulses:      pulses normal in all 4 extremities.    Extremities:       No lower extremity edema  Neurologic:      no focal deficits.   alert oriented x3  Skin:       intact without lesions or rashes.    Psych:      alert and cooperative; normal mood and affect; normal attention span and concentration.      Result Review :               Pertinent cardiac workup    EKG 11/20/2024 normal sinus rhythm  Stress test 2/25/2021 inferior apical fixed defect, ejection fraction 58%  Echocardiogram 3/1/2021 ejection fraction 60%      Procedures        Assessment and Plan      Renzo Thompson is a 59-year-old male patient with hypertension, dyslipidemia, family history of CAD, here today for follow-up. Patient is not having any exertional chest pain or shortness of breath, he is having vertigo and headaches but no concerning symptoms of CHF or obstructive CAD.   His blood pressure is a little on the high side, will go ahead and increase Norvasc to 5 mg once a day.  He is also on Toprol 25 mg once a day.  Patient will continue to follow-up with primary care, we will see him on as-needed basis.    Diagnoses and all orders for this visit:    1. Essential hypertension [I10] (Primary)  -     amLODIPine (NORVASC) 5 MG tablet; Take 1 tablet by mouth Daily.  Dispense: 90 tablet; Refill: 3           No follow-ups on file.  Patient was given instructions and counseling regarding his condition or for health maintenance advice. Please see specific information pulled into the AVS if appropriate.       Electronically signed by Cathy Prater MD, 11/20/24, 2:59 PM EST.

## 2024-12-21 DIAGNOSIS — I10 ESSENTIAL HYPERTENSION: ICD-10-CM

## 2024-12-23 RX ORDER — AMLODIPINE BESYLATE 2.5 MG/1
2.5 TABLET ORAL DAILY
Qty: 30 TABLET | Refills: 6 | Status: SHIPPED | OUTPATIENT
Start: 2024-12-23

## 2024-12-23 NOTE — TELEPHONE ENCOUNTER
Rx Refill Note  Requested Prescriptions     Pending Prescriptions Disp Refills    amLODIPine (NORVASC) 2.5 MG tablet [Pharmacy Med Name: AMLODIPINE BESYLATE 2.5 MG TAB] 30 tablet 0     Sig: TAKE 1 TABLET BY MOUTH EVERY DAY      Last office visit with prescribing clinician: 11/20/2024   Last telemedicine visit with prescribing clinician: Visit date not found   Next office visit with prescribing clinician: Visit date not found                         Would you like a call back once the refill request has been completed: [] Yes [] No    If the office needs to give you a call back, can they leave a voicemail: [] Yes [] No    Nena Braga MA  12/23/24, 09:03 EST

## 2025-03-07 RX ORDER — ATORVASTATIN CALCIUM 20 MG/1
20 TABLET, FILM COATED ORAL DAILY
Qty: 90 TABLET | Refills: 3 | Status: SHIPPED | OUTPATIENT
Start: 2025-03-07

## 2025-03-07 RX ORDER — METOPROLOL SUCCINATE 25 MG/1
25 TABLET, EXTENDED RELEASE ORAL DAILY
Qty: 90 TABLET | Refills: 3 | Status: SHIPPED | OUTPATIENT
Start: 2025-03-07

## 2025-03-07 NOTE — TELEPHONE ENCOUNTER
Patient has recent lab results in the chart from 2/25/25    Rx Refill Note  Requested Prescriptions     Signed Prescriptions Disp Refills    metoprolol succinate XL (TOPROL-XL) 25 MG 24 hr tablet 90 tablet 3     Sig: TAKE 1 TABLET BY MOUTH EVERY DAY     Authorizing Provider: RENATE PARKS     Ordering User: MARIO ALBERTO BRAGA    atorvastatin (LIPITOR) 20 MG tablet 90 tablet 3     Sig: TAKE 1 TABLET BY MOUTH EVERY DAY     Authorizing Provider: RENATE PARKS     Ordering User: MARIO ALBERTO BRAGA      Last office visit with prescribing clinician: 11/20/2024   Last telemedicine visit with prescribing clinician: Visit date not found   Next office visit with prescribing clinician: Visit date not found                         Would you like a call back once the refill request has been completed: [] Yes [] No    If the office needs to give you a call back, can they leave a voicemail: [] Yes [] No    Mario Alberto Braga MA  03/07/25, 09:25 EST

## 2025-07-05 DIAGNOSIS — I10 ESSENTIAL HYPERTENSION: ICD-10-CM

## 2025-07-07 RX ORDER — AMLODIPINE BESYLATE 2.5 MG/1
2.5 TABLET ORAL DAILY
Qty: 30 TABLET | Refills: 3 | Status: SHIPPED | OUTPATIENT
Start: 2025-07-07

## 2025-07-07 NOTE — TELEPHONE ENCOUNTER
Rx Refill Note  Requested Prescriptions     Pending Prescriptions Disp Refills    amLODIPine (NORVASC) 2.5 MG tablet [Pharmacy Med Name: AMLODIPINE BESYLATE 2.5 MG TAB] 30 tablet 6     Sig: TAKE 1 TABLET BY MOUTH EVERY DAY      Last office visit with prescribing clinician: 11/20/2024   Last telemedicine visit with prescribing clinician: Visit date not found   Next office visit with prescribing clinician: Visit date not found                         Would you like a call back once the refill request has been completed: [] Yes [] No    If the office needs to give you a call back, can they leave a voicemail: [] Yes [] No    Juliet Hernández MA  07/07/25, 09:25 EDT